# Patient Record
Sex: FEMALE | Race: WHITE | HISPANIC OR LATINO | Employment: PART TIME | URBAN - METROPOLITAN AREA
[De-identification: names, ages, dates, MRNs, and addresses within clinical notes are randomized per-mention and may not be internally consistent; named-entity substitution may affect disease eponyms.]

---

## 2018-07-10 ENCOUNTER — ANESTHESIA - HEALTHEAST (OUTPATIENT)
Dept: SURGERY | Facility: HOSPITAL | Age: 38
End: 2018-07-10

## 2018-07-10 ASSESSMENT — MIFFLIN-ST. JEOR: SCORE: 1534.52

## 2018-07-11 ENCOUNTER — SURGERY - HEALTHEAST (OUTPATIENT)
Dept: SURGERY | Facility: HOSPITAL | Age: 38
End: 2018-07-11

## 2021-05-04 ENCOUNTER — RECORDS - HEALTHEAST (OUTPATIENT)
Dept: LAB | Facility: CLINIC | Age: 41
End: 2021-05-04

## 2021-05-07 LAB
BACTERIA SPEC CULT: ABNORMAL
BACTERIA SPEC CULT: ABNORMAL

## 2021-06-01 VITALS — HEIGHT: 62 IN | WEIGHT: 202 LBS | BODY MASS INDEX: 37.17 KG/M2

## 2021-06-19 NOTE — ANESTHESIA POSTPROCEDURE EVALUATION
Patient: Perlita Day  LAPAROSCOPY, RIGHT SALPINGO OOPHORECTOMY, LYSIS OF ADHESIONS  Anesthesia type: general    Patient location: PACU  Last vitals:   Vitals:    07/11/18 1050   BP: 165/87   Pulse: 96   Resp: 16   Temp:    SpO2: 95%     Post vital signs: stable  Level of consciousness: awake and answers questions via interpeter, states she is doing well.  Post-anesthesia pain: pain controlled  Post-anesthesia nausea and vomiting: no  Pulmonary: requiring supplemental oxygen at this time  Cardiovascular: stable and blood pressure at baseline  Hydration: adequate  Anesthetic events: no    QCDR Measures:  ASA# 11 - Claire-op Cardiac Arrest: ASA11B - Patient did NOT experience unanticipated cardiac arrest  ASA# 12 - Claire-op Mortality Rate: ASA12B - Patient did NOT die  ASA# 13 - PACU Re-Intubation Rate: ASA13B - Patient did NOT require a new airway mgmt  ASA# 10 - Composite Anes Safety: ASA10A - No serious adverse event    Additional Notes:

## 2021-06-19 NOTE — ANESTHESIA PREPROCEDURE EVALUATION
Anesthesia Evaluation      Patient summary reviewed   No history of anesthetic complications     Airway   Mallampati: II  Neck ROM: full   Pulmonary - negative ROS and normal exam                          Cardiovascular - normal exam  Exercise tolerance: > or = 4 METS  (+) , hypercholesterolemia,      Neuro/Psych - negative ROS     Endo/Other    (+) diabetes mellitus type 2 poorly controlled using insulin, obesity,      GI/Hepatic/Renal - negative ROS      Other findings: BMI 36+. Diabetic retinopathy.  Denies hyperthyroidism (listed in Epic).        Dental - normal exam                        Anesthesia Plan  Planned anesthetic: general endotracheal    ASA 3   Induction: intravenous   Anesthetic plan and risks discussed with: patient    Post-op plan: routine recovery

## 2021-06-19 NOTE — ANESTHESIA CARE TRANSFER NOTE
Last vitals:   Vitals:    07/11/18 1019   BP: (!) 185/91   Pulse: 96   Resp: 16   Temp: 36.8  C (98.2  F)   SpO2: 98%     Patient's level of consciousness is drowsy  Spontaneous respirations: yes  Maintains airway independently: yes  Dentition unchanged: yes  Oropharynx: oropharynx clear of all foreign objects    QCDR Measures:  ASA# 20 - Surgical Safety Checklist: WHO surgical safety checklist completed prior to induction  PQRS# 430 - Adult PONV Prevention: 4558F - Pt received => 2 anti-emetic agents (different classes) preop & intraop  ASA# 8 - Peds PONV Prevention: NA - Not pediatric patient, not GA or 2 or more risk factors NOT present  PQRS# 424 - Claire-op Temp Management: 4559F - At least one body temp DOCUMENTED => 35.5C or 95.9F within required timeframe  PQRS# 426 - PACU Transfer Protocol: - Transfer of care checklist used  ASA# 14 - Acute Post-op Pain: ASA14B - Patient did NOT experience pain >= 7 out of 10

## 2021-11-03 ENCOUNTER — HOSPITAL ENCOUNTER (EMERGENCY)
Facility: HOSPITAL | Age: 41
Discharge: HOME | End: 2021-11-03
Admitting: PHYSICIAN ASSISTANT

## 2021-11-03 VITALS
BODY MASS INDEX: 33.43 KG/M2 | DIASTOLIC BLOOD PRESSURE: 85 MMHG | TEMPERATURE: 97.3 F | RESPIRATION RATE: 18 BRPM | SYSTOLIC BLOOD PRESSURE: 189 MMHG | HEIGHT: 66 IN | OXYGEN SATURATION: 99 % | WEIGHT: 208 LBS | HEART RATE: 93 BPM

## 2021-11-03 DIAGNOSIS — S05.01XA ABRASION OF RIGHT CORNEA, INITIAL ENCOUNTER: ICD-10-CM

## 2021-11-03 PROBLEM — K43.0 INCARCERATED INCISIONAL HERNIA: Status: ACTIVE | Noted: 2021-08-22

## 2021-11-03 PROCEDURE — 99283 EMERGENCY DEPT VISIT LOW MDM: CPT

## 2021-11-03 RX ORDER — ERYTHROMYCIN 5 MG/G
0.5 OINTMENT OPHTHALMIC 4 TIMES DAILY
Qty: 10 G | Refills: 0 | Status: SHIPPED | OUTPATIENT
Start: 2021-11-03 | End: 2021-11-08

## 2021-11-03 RX ORDER — PROPARACAINE HYDROCHLORIDE 5 MG/ML
1 SOLUTION/ DROPS OPHTHALMIC ONCE
Status: DISCONTINUED | OUTPATIENT
Start: 2021-11-03 | End: 2021-11-03

## 2021-11-03 RX ORDER — TETRACAINE HYDROCHLORIDE 5 MG/ML
1-2 SOLUTION OPHTHALMIC ONCE
Status: DISCONTINUED | OUTPATIENT
Start: 2021-11-03 | End: 2021-11-03 | Stop reason: HOSPADM

## 2021-11-03 ASSESSMENT — ENCOUNTER SYMPTOMS
FACIAL SWELLING: 0
PALPITATIONS: 0
VOMITING: 0
LIGHT-HEADEDNESS: 0
TREMORS: 0
CONFUSION: 0
FEVER: 0
NAUSEA: 0
ADENOPATHY: 0
SHORTNESS OF BREATH: 0
COUGH: 0
WOUND: 0
EYE ITCHING: 0
EYE REDNESS: 1
HEADACHES: 0
ABDOMINAL PAIN: 0
ARTHRALGIAS: 0
MYALGIAS: 0
DIZZINESS: 0
PHOTOPHOBIA: 0
CHEST TIGHTNESS: 0
AGITATION: 0
EYE PAIN: 1
EYE DISCHARGE: 0
FATIGUE: 0
ACTIVITY CHANGE: 0
WEAKNESS: 0
SPEECH DIFFICULTY: 0
COLOR CHANGE: 0
NERVOUS/ANXIOUS: 0
WHEEZING: 0
VOICE CHANGE: 0
SORE THROAT: 0

## 2021-11-03 ASSESSMENT — MIFFLIN-ST. JEOR: SCORE: 1620.23

## 2021-11-03 ASSESSMENT — VISUAL ACUITY
OD: 20/200;WITH CORRECTIVE LENSES
OS: 20/25;WITH CORRECTIVE LENSES

## 2021-11-04 NOTE — DISCHARGE INSTRUCTIONS
You were seen in the emergency department for concern of right eye pain.  It does look like you have a scratch on the surface of your eye, this is called a corneal abrasion.  This should heal over the next several days.      We will help prevent infection by using an antibiotic ointment.  You should apply this to the eye 4 times a day for the next 5 days    If your symptoms are not improving in 3 days, you should be seen by the eye doctor.  I will give you information for [facility], you can call them and let them know you are seen in the emergency department and they should be able to get you in sooner.    If you develop any increased eye pain, fever, swelling or anything else concerning to you please return to the emergency department.

## 2021-11-04 NOTE — ED PROVIDER NOTES
EMERGENCY DEPARTMENT ENCOUNTER      NAME: Perlita Day  AGE: 41 year old female  YOB: 1980  MRN: [id number]  EVALUATION DATE & TIME: 11/3/2021  6:53 PM    PCP: No primary care provider on file.    ED PROVIDER: Amarilys Resendez PA-C      Chief Complaint   Patient presents with     Eye Problem         FINAL IMPRESSION:  1. Abrasion of right cornea, initial encounter        MEDICAL DECISION MAKING:    Pertinent Labs & Imaging studies reviewed. (See chart for details)  41 year old female with a h/o hypertension, diabetes presents to the Emergency Department for evaluation of right eye pain x2 days.  Patient wears glasses, denies any trauma or external irritation to the eye, however she is blurred vision and pain with upward and downward gaze for 2 days.  No drainage.  No fevers.  No localized edema.    On exam she is alert, nontoxic-appearing and sitting comfortably in the exam bed in no acute distress.  Vital signs are notable for slightly elevated blood pressure.  She has intact EOM, and does complain of discomfort with upward and downward gaze.  No ocular entrapment or palsy.  No periorbital edema. Ocular pressures are 14, 17, 14 on the right and 13, 14, 14 on the left. Visual acuity with corrective lenses 20/200 on the right, 20/40 on the left. There is central corneal uptake of fluorescein stain of the right cornea. The right lateral conjunctiva is injected.    Differential diagnosis includes corneal abrasion, acute angle closure glaucoma, uveitis, conjunctival hemorrhage, corneal foreign body, episcleritis, orbital cellulitis. Ocular pressures are normal. Guardado light exam reveals centralized uptake concerning for abrasion. No corneal ulcerations.    Will rx erythromycin ointment and give information for opthalmology follow up. There is no evidence of acute or emergent process requiring intervention at this time. Pt is appropriate for outpatient management. Provisional nature of today's  diagnosis was discussed and strict return precautions were given. Pt expressed understanding and She was discharged to home in good condition.     CRITICAL CARE: None    ED COURSE  6:45 PM  Met and evaluated patient. Discussed ED plan.   7:15 Pm Discussed pt with Dr. Marshall Mireles  8:30 PM Discharged home in good condition by RN.     MEDICATIONS GIVEN IN THE EMERGENCY:  Medications - No data to display    NEW PRESCRIPTIONS STARTED AT TODAY'S ER VISIT  Discharge Medication List as of 11/3/2021  8:31 PM      START taking these medications    Details   erythromycin (ROMYCIN) 5 MG/GM ophthalmic ointment Place 0.5 inches into the right eye 4 times daily for 5 daysDisp-10 g, [id number]                =================================================================    HPI    Patient information was obtained from: Patient and daughter in law    Use of Intrepreter: Yes Phone,  Language Angolan    Perlita Day is a 41 year old female who presents for evaluation of right eye pain x 2 days. Reports sudden onset 2 days ago without trauma. She wears corrective eye glasses but no contacts. She has tried OTC lubricating drops without significant relief. No active drainage, no pus. No pain with blinking the eye. States she does have erythema to the right lateral portion of the eye which has not progressed.     She does report a history of hypertension and DMII    REVIEW OF SYSTEMS   Review of Systems   Constitutional: Negative for activity change, fatigue and fever.   HENT: Negative for facial swelling, sore throat and voice change.    Eyes: Positive for pain, redness and visual disturbance. Negative for photophobia, discharge and itching.   Respiratory: Negative for cough, chest tightness, shortness of breath and wheezing.    Cardiovascular: Negative for chest pain and palpitations.   Gastrointestinal: Negative for abdominal pain, nausea and vomiting.   Musculoskeletal: Negative for arthralgias, gait problem and  myalgias.   Skin: Negative for color change, pallor, rash and wound.   Neurological: Negative for dizziness, tremors, speech difficulty, weakness, light-headedness and headaches.   Hematological: Negative for adenopathy.   Psychiatric/Behavioral: Negative for agitation, behavioral problems and confusion. The patient is not nervous/anxious.    All other systems reviewed and are negative.      PAST MEDICAL HISTORY:  No past medical history on file.    PAST SURGICAL HISTORY:  Past Surgical History:   Procedure Laterality Date     CHOLECYSTECTOMY  2003     UT LAP,DIAGNOSTIC ABDOMEN Right 7/11/2018    Procedure: LAPAROSCOPY, RIGHT SALPINGO OOPHORECTOMY, LYSIS OF ADHESIONS;  Surgeon: Steven Barajas MD;  Location: [facility];  Service: Gynecology       CURRENT MEDICATIONS:    erythromycin (ROMYCIN) 5 MG/GM ophthalmic ointment  folic acid (FOLVITE) 1 MG tablet  insulin detemir U-100 (LEVEMIR) 100 unit/mL injection  metFORMIN (GLUCOPHAGE) 1000 MG tablet  omega-3/dha/epa/fish oil (FISH OIL-OMEGA-3 FATTY ACIDS) 300-1,000 mg capsule  oxyCODONE-acetaminophen (PERCOCET) 5-325 mg per tablet        ALLERGIES:  No Known Allergies    FAMILY HISTORY:  Family History   Problem Relation Age of Onset     Diabetes Mother      Hypertension Mother      Diabetes Father      Hypertension Maternal Aunt      Hypertension Paternal Aunt      Diabetes Maternal Grandmother      Hypertension Maternal Grandmother        SOCIAL HISTORY:   Social History     Socioeconomic History     Marital status: Single     Spouse name: Not on file     Number of children: Not on file     Years of education: Not on file     Highest education level: Not on file   Occupational History     Not on file   Tobacco Use     Smoking status: Never Smoker     Smokeless tobacco: Never Used   Substance and Sexual Activity     Alcohol use: No     Drug use: No     Sexual activity: Not on file   Other Topics Concern     Not on file   Social History Narrative     Not on file  "    Social Determinants of Health     Financial Resource Strain:      Difficulty of Paying Living Expenses:    Food Insecurity:      Worried About Running Out of Food in the Last Year:      Ran Out of Food in the Last Year:    Transportation Needs:      Lack of Transportation (Medical):      Lack of Transportation (Non-Medical):    Physical Activity:      Days of Exercise per Week:      Minutes of Exercise per Session:    Stress:      Feeling of Stress :    Social Connections:      Frequency of Communication with Friends and Family:      Frequency of Social Gatherings with Friends and Family:      Attends [employer] Services:      Active Member of Clubs or Organizations:      Attends Club or Organization Meetings:      Marital Status:    Intimate Partner Violence:      Fear of Current or Ex-Partner:      Emotionally Abused:      Physically Abused:      Sexually Abused:          VITALS:  Patient Vitals for the past 24 hrs:   BP Temp Temp src Pulse Resp SpO2 Height Weight   11/03/21 1830 (!) 189/85 97.3  F (36.3  C) Temporal 93 18 99 % 1.676 m (5' 6\") 94.3 kg (208 lb)       PHYSICAL EXAM    Physical Exam  Vitals reviewed.   Constitutional:       General: She is not in acute distress.     Appearance: Normal appearance. She is not ill-appearing, toxic-appearing or diaphoretic.   HENT:      Head: Normocephalic and atraumatic.      Nose: No congestion.      Mouth/Throat:      Mouth: Mucous membranes are moist.      Pharynx: Oropharynx is clear.   Eyes:      General: No scleral icterus.        Right eye: No discharge.         Left eye: No discharge.      Extraocular Movements: Extraocular movements intact.      Pupils: Pupils are equal, round, and reactive to light.      Comments: No periorbital edema  No discharge  No chalazion or hordeolum     Cardiovascular:      Rate and Rhythm: Normal rate and regular rhythm.      Pulses: Normal pulses.      Heart sounds: No murmur heard.     Pulmonary:      Effort: Pulmonary effort is " normal. No respiratory distress.   Musculoskeletal:         General: No swelling or deformity. Normal range of motion.      Cervical back: Normal range of motion and neck supple.      Right lower leg: No edema.      Left lower leg: No edema.   Skin:     General: Skin is warm.      Capillary Refill: Capillary refill takes less than 2 seconds.      Coloration: Skin is not jaundiced.      Findings: No bruising, erythema, lesion or rash.   Neurological:      General: No focal deficit present.      Mental Status: She is alert and oriented to person, place, and time.      Cranial Nerves: No cranial nerve deficit.   Psychiatric:         Mood and Affect: Mood normal.         Behavior: Behavior normal.          LAB:  All pertinent labs reviewed and interpreted.    Labs Ordered and Resulted from Time of ED Arrival to Time of ED Departure - No data to display      RADIOLOGY:  Reviewed all pertinent imaging. Please see official radiology report    No orders to display         Amarilys Resendez PA-C  Emergency Medicine  [facility] EMERGENCY DEPARTMENT  [address] [place] [id number] [phone]  Dept: [phone]    This note has in part been created with speech recognition technology and may create an occasional, unintended word/grammar substitution. Errors are generally corrected in real time. Please message me via [employer]arity[facility] In Basket if you note any errors requiring clarification.       Amarilys Resendez PA-C  11/03/21 5544

## 2021-11-12 ENCOUNTER — TRANSCRIBE ORDERS (OUTPATIENT)
Dept: OTHER | Age: 41
End: 2021-11-12

## 2021-11-12 ENCOUNTER — TELEPHONE (OUTPATIENT)
Dept: OPHTHALMOLOGY | Facility: CLINIC | Age: 41
End: 2021-11-12

## 2021-11-12 ENCOUNTER — APPOINTMENT (OUTPATIENT)
Dept: INTERPRETER SERVICES | Facility: CLINIC | Age: 41
End: 2021-11-12

## 2021-11-12 DIAGNOSIS — E11.3521 RIGHT EYE AFFECTED BY PROLIFERATIVE DIABETIC RETINOPATHY WITH TRACTION RETINAL DETACHMENT INVOLVING MACULA, ASSOCIATED WITH TYPE 2 DIABETES MELLITUS (H): Primary | ICD-10-CM

## 2021-11-12 NOTE — TELEPHONE ENCOUNTER
Referral received by clinic to Dr Ruiz    Proliferative diabetic retinopathy    No insurance coverage noted in referral    Note to finacial counselor to reach out and review options for pt to apply for assistance.    Retina team aware and reviewing scheduling option and will be reaching out to schedule    Moshe Woods RN 12:37 PM 11/12/21

## 2022-01-06 ENCOUNTER — APPOINTMENT (OUTPATIENT)
Dept: INTERPRETER SERVICES | Facility: CLINIC | Age: 42
End: 2022-01-06
Payer: MEDICAID

## 2022-02-14 ENCOUNTER — OFFICE VISIT (OUTPATIENT)
Dept: OPHTHALMOLOGY | Facility: CLINIC | Age: 42
End: 2022-02-14
Attending: OPHTHALMOLOGY
Payer: MEDICAID

## 2022-02-14 DIAGNOSIS — E11.3593 PROLIFERATIVE DIABETIC RETINOPATHY OF BOTH EYES ASSOCIATED WITH TYPE 2 DIABETES MELLITUS, MACULAR EDEMA PRESENCE UNSPECIFIED (H): Primary | ICD-10-CM

## 2022-02-14 DIAGNOSIS — H33.41 RETINAL DETACHMENT, TRACTIONAL, RIGHT: ICD-10-CM

## 2022-02-14 PROCEDURE — 92134 CPTRZ OPH DX IMG PST SGM RTA: CPT | Performed by: OPHTHALMOLOGY

## 2022-02-14 PROCEDURE — 99204 OFFICE O/P NEW MOD 45 MIN: CPT | Performed by: OPHTHALMOLOGY

## 2022-02-14 PROCEDURE — T1013 SIGN LANG/ORAL INTERPRETER: HCPCS | Mod: U4

## 2022-02-14 PROCEDURE — G0463 HOSPITAL OUTPT CLINIC VISIT: HCPCS

## 2022-02-14 PROCEDURE — 92250 FUNDUS PHOTOGRAPHY W/I&R: CPT | Performed by: OPHTHALMOLOGY

## 2022-02-14 ASSESSMENT — VISUAL ACUITY
METHOD_MR: DIAGNOSTIC MR
OS_CC: 20/25
CORRECTION_TYPE: GLASSES
METHOD_MR_RETINOSCOPY: 1
METHOD: SNELLEN - LINEAR
OD_CC: CF@1'
OS_CC+: -2

## 2022-02-14 ASSESSMENT — TONOMETRY
OD_IOP_MMHG: 12
OS_IOP_MMHG: 14
IOP_METHOD: TONOPEN

## 2022-02-14 ASSESSMENT — CONF VISUAL FIELD
OD_INFERIOR_NASAL_RESTRICTION: 1
OD_SUPERIOR_NASAL_RESTRICTION: 1
OD_INFERIOR_TEMPORAL_RESTRICTION: 1
METHOD: TOYS
OD_SUPERIOR_TEMPORAL_RESTRICTION: 1

## 2022-02-14 ASSESSMENT — REFRACTION_MANIFEST
OD_SPHERE: -2.00
OS_AXIS: 165
OS_ADD: +1.00
OD_CYLINDER: SPHERE
OS_CYLINDER: +0.50
OS_SPHERE: -2.25
OD_ADD: +1.00

## 2022-02-14 ASSESSMENT — REFRACTION_WEARINGRX
OS_AXIS: 090
OS_SPHERE: -2.50
OD_CYLINDER: SPHERE
OS_CYLINDER: +0.25
OD_SPHERE: -2.00

## 2022-02-14 ASSESSMENT — EXTERNAL EXAM - RIGHT EYE: OD_EXAM: NORMAL

## 2022-02-14 ASSESSMENT — SLIT LAMP EXAM - LIDS
COMMENTS: NORMAL
COMMENTS: NORMAL

## 2022-02-14 ASSESSMENT — EXTERNAL EXAM - LEFT EYE: OS_EXAM: NORMAL

## 2022-02-14 NOTE — NURSING NOTE
Chief Complaints and History of Present Illnesses   Patient presents with     Annual Eye Exam     DM type 2     Chief Complaint(s) and History of Present Illness(es)     Annual Eye Exam     Associated symptoms: Negative for redness, headache, floaters, itching, flashes and eye pain    Pain scale: 0/10    Comments: DM type 2              Comments     C/o blurry vision right eye X4 month   Diabetic Type 2 Dx 8 years ago  LBS: 137  Last A1c: 7.0  No results found for: A1C    Shauna Silverio COT 2:37 PM February 14, 2022

## 2022-02-14 NOTE — PROGRESS NOTES
HPI:  Perlita Day is a 41 year old female presenting for complete eye exam.    Poor vision right eye for the past 4 months. Has been unchanged for the past 4 months but very poor. Left eye is OK with glasses. Will get pain around the eyes if she has them open for a long time or is somewhere very dry.  Improves with ATs. Otherwise no eye pain.  About 10 years ago had an episode of left facial droop and weakness and had decreased vision left eye but this improved over time. Got glasses about 7-8 years ago that helped with vision.  Had eye exam at [facility] in [facility] October and was told she had a lot of blood in the right eye and would need surgery. Referred for evaluation here. Had hernia surgery and then mother had COVID in December and so appointment was delayed.    Past Ocular History:  Told she has blood in the right eye  Glasses    PMH:  DM2 - diagnosed 2014; on insulin    SH:  Never smoker. Not working presently.    FH:  Father - diabetes, cataracts, macular degeneration  Brother - diabetes, cataracts, poor vision    ASSESSMENT and PLAN:  1. Proliferative diabetic retinopathy of both eyes associated with type 2 diabetes mellitus, macular edema presence unspecified (H)  2. Retinal detachment, tractional, right  - PDR each eye with mac off TRD right eye  - baseline fundus photos 2/14/22  - OCT 2/14/22  Right Eye: partially detached posterior hyaloid, tractional retinal detachment including macular detachment, few IR fluid pockets centrally  Left Eye: partially detached posterior hyaloid with vit heme, slight blunting of foveal contour, nasal ERM, IRF and exudates inf and temp    - discussed with patient need for treatment each eye including surgery right eye; discussed guarded visual prognosis in setting of severe retinal disease and prolonged duration untreated  - follow up with surgical retina  - encouraged BS/BP control    --> to see Dr. Murphy on Thursday 2/17        -----------------------------------------------------------------------------------    Attestation:  Complete documentation of historical and exam elements from today's encounter can be found in the full encounter summary report (not reduplicated in this progress note). I personally obtained the chief complaint(s) and history of present illness.  I confirmed and edited as necessary the review of systems, past medical/surgical history, family history, social history, and examination findings as documented by others; and I examined the patient myself. I personally reviewed the relevant tests, images, and reports as documented above.     I formulated and edited as necessary the assessment and plan and discussed the findings and management plan with the patient and family.      Ana Barlow MD

## 2022-02-17 ENCOUNTER — OFFICE VISIT (OUTPATIENT)
Dept: OPHTHALMOLOGY | Facility: CLINIC | Age: 42
End: 2022-02-17
Attending: OPHTHALMOLOGY
Payer: MEDICAID

## 2022-02-17 DIAGNOSIS — H33.41 RETINAL DETACHMENT, TRACTIONAL, RIGHT: ICD-10-CM

## 2022-02-17 DIAGNOSIS — E11.3593 PROLIFERATIVE DIABETIC RETINOPATHY OF BOTH EYES ASSOCIATED WITH TYPE 2 DIABETES MELLITUS, MACULAR EDEMA PRESENCE UNSPECIFIED (H): Primary | ICD-10-CM

## 2022-02-17 PROCEDURE — 99214 OFFICE O/P EST MOD 30 MIN: CPT | Mod: 25 | Performed by: OPHTHALMOLOGY

## 2022-02-17 PROCEDURE — G0463 HOSPITAL OUTPT CLINIC VISIT: HCPCS

## 2022-02-17 PROCEDURE — 67228 TREATMENT X10SV RETINOPATHY: CPT | Mod: LT | Performed by: OPHTHALMOLOGY

## 2022-02-17 PROCEDURE — T1013 SIGN LANG/ORAL INTERPRETER: HCPCS | Mod: U4

## 2022-02-17 ASSESSMENT — EXTERNAL EXAM - RIGHT EYE: OD_EXAM: NORMAL

## 2022-02-17 ASSESSMENT — TONOMETRY
OD_IOP_MMHG: 10
OS_IOP_MMHG: 13
IOP_METHOD: TONOPEN

## 2022-02-17 ASSESSMENT — CONF VISUAL FIELD
OD_INFERIOR_TEMPORAL_RESTRICTION: 1
OD_SUPERIOR_TEMPORAL_RESTRICTION: 1
OS_NORMAL: 1
OD_SUPERIOR_NASAL_RESTRICTION: 1
OD_INFERIOR_NASAL_RESTRICTION: 1

## 2022-02-17 ASSESSMENT — VISUAL ACUITY
OD_CC: CF @ 1'
METHOD: SNELLEN - LINEAR
OS_CC: 20/25
OS_CC+: -2

## 2022-02-17 ASSESSMENT — REFRACTION_WEARINGRX
OD_CYLINDER: SPHERE
OS_SPHERE: -2.50
OS_AXIS: 090
OD_SPHERE: -2.00
OS_CYLINDER: +0.25

## 2022-02-17 ASSESSMENT — EXTERNAL EXAM - LEFT EYE: OS_EXAM: NORMAL

## 2022-02-17 ASSESSMENT — SLIT LAMP EXAM - LIDS
COMMENTS: NORMAL
COMMENTS: NORMAL

## 2022-02-17 ASSESSMENT — CUP TO DISC RATIO: OS_RATIO: 0.3

## 2022-02-17 NOTE — NURSING NOTE
Chief Complaints and History of Present Illnesses   Patient presents with     Diabetic Retinopathy Evaluation     Chief Complaint(s) and History of Present Illness(es)     Diabetic Retinopathy Evaluation     Laterality: both eyes    Onset: gradual    Associated symptoms: floaters, flashes (RE) and dryness.  Negative for eye pain and itching    Response to treatment: no improvement    Pain scale: 0/10              Comments     Perlita is here referred by Dr Barlow for mac off RE and Proliferative diabetic retinopathy of both eyes associated with type 2 diabetes mellitus, macular edema presence unspecified. Perlita states no vision change since sii tutu Barlow three day ago.      Charlie Rojas COT 12:03 PM February 17, 2022

## 2022-02-17 NOTE — PROGRESS NOTES
CC: Perlita Day is a 41 year old female  Referred by Dr. Barlow for treatment of Tractional retinal detachment right eye; proliferative diabetic retinopathy both eyes     First sara with me     HPI: patient reports Poor vision right eye for the past 4 months. Has been unchanged for the past 4 months but very poor. Left eye is OK with glasses. Will get pain around the eyes if she has them open for a long time or is somewhere very dry.  Improves with ATs. Otherwise no eye pain.    About 10 years ago had an episode of left facial droop and weakness and had decreased vision left eye but this improved over time. Got glasses about 7-8 years ago that helped with vision.    Had eye exam at [facility] in [facility] October and was told she had a lot of blood in the right eye and would need surgery. Referred for evaluation here. Had hernia surgery and then mother had COVID in December and so appointment was delayed.    She feels her vision has been poor since 10/2021    Past Ocular History: Told she has blood in the right eye    PMH: DM2 - diagnosed 2014; on insulin (states last a1c in 10/2021 was 7)    SH: Never smoker. Not working presently.    FH: Father - diabetes, cataracts, macular degeneration  Brother - diabetes, cataracts, poor vision    Imaging:  OCT Macula 02/17/22  OD: detached temporally w/ nasal macula attached, fovea off, PHV attached  OS: attached, nl foveal contour, central mild IRF, PHF  inferiorly and attached superiorly    ASSESSMENT and PLAN:    #  Proliferative diabetic retinopathy of both eyes associated with type 2 diabetes mellitus,   With Diabetic macular edema left eye      # Retinal detachment, tractional, right  - PDR each eye with mac off TRD right eye  - baseline fundus photos 2/14/22  - OCT 2/14/22  Right Eye: partially detached posterior hyaloid, tractional retinal detachment including macular detachment, few IR fluid pockets centrally  Left Eye: partially detached  posterior hyaloid with vit heme, slight blunting of foveal contour, nasal ERM, IRF and exudates inf and temp    - discussed with patient need for treatment each eye including surgery right eye; discussed guarded visual prognosis in setting of severe retinal disease and prolonged duration untreated  - follow up with surgical retina  - encouraged BS/BP control    PLAN  - recommend laser left eye today and right eye if there is any retina attached for Panretinal laser photocoagulation (PRP)    - plan for   25 g Pars plana vitrectomy (PPV)/ membrane peel/ endolaser / gas vs oil right eye   2 hours surgery   General anesthesia    I discussed the risks, benefits, and alternatives of retinal detachment surgery with the patient.  I explained that with surgery there was approximately a 70 to 80 percent chance of success and that the surgery might fail due to formation of fibrosis, recurrent vitreous hemorrhage  or other postoperative problems.  I explained that if the surgery failed, additional surgeries might be needed.  I explained that if there were subsequent re-detachments, even more vision might be lost.  I explained that with a gas bubble in the eye, a particular head position after surgery including face-down might be necessary for a few weeks after surgery.  I also explained that airplane travel or high altitudes would have to be avoided for up to three months after surgery.  I explained that an oil bubble could be placed instead, and that it would not require such strict positioning or travel restrictions. However, an oil bubble would require further surgeries to be removed.  I explained there was a chance I might have to remove their lens during my surgery. Also there Is an increased risk for worsening cataract and need for further surgery. After explaining all risks, benefits and alternatives of the surgery including but not limited to endophthalmitis, retina detachment and cataract the patient elected to  proceed.        # Cataract both eyes   Observe for now     Will need insurance for avastin and fluorescein angiography approval   Plan for Panretinal laser photocoagulation (PRP) today 02/17/22     Josh Chin MD  Retina Fellow, PGY5    ~~~~~~~~~~~~~~~~~~~~~~~~~~~~~~~~~~   Complete documentation of historical and exam elements from today's encounter can be found in the full encounter summary report (not reduplicated in this progress note).  I personally obtained the chief complaint(s) and history of present illness.  I confirmed and edited as necessary the review of systems, past medical/surgical history, family history, social history, and examination findings as documented by others; and I examined the patient myself.  I personally reviewed the relevant tests, images, and reports as documented above.  I formulated and edited as necessary the assessment and plan and discussed the findings and management plan with the patient and family and I was present for critical portions of the procedure carried out by the resident/fellow and immediately available for the entire procedure    Emmy Murphy MD   of Ophthalmology.  Retina Service   Department of Ophthalmology and Visual Neurosciences   [facility]  Phone: [phone]   Fax: [phone]

## 2022-02-17 NOTE — LETTER
2/17/2022       RE: Perlita Day  [address]  [place] [address]     Dear Colleague,    Thank you for referring your patient, Perlita Day, to the [facility] at [facility]. Please see a copy of my visit note below.    CC: Perlita Day is a 41 year old female  Referred by Dr. Barlow for treatment of Tractional retinal detachment right eye; proliferative diabetic retinopathy both eyes     First sara with me     HPI: patient reports Poor vision right eye for the past 4 months. Has been unchanged for the past 4 months but very poor. Left eye is OK with glasses. Will get pain around the eyes if she has them open for a long time or is somewhere very dry.  Improves with ATs. Otherwise no eye pain.    About 10 years ago had an episode of left facial droop and weakness and had decreased vision left eye but this improved over time. Got glasses about 7-8 years ago that helped with vision.    Had eye exam at [facility] in [facility] October and was told she had a lot of blood in the right eye and would need surgery. Referred for evaluation here. Had hernia surgery and then mother had COVID in December and so appointment was delayed.    She feels her vision has been poor since 10/2021    Past Ocular History: Told she has blood in the right eye    PMH: DM2 - diagnosed 2014; on insulin (states last a1c in 10/2021 was 7)    SH: Never smoker. Not working presently.    FH: Father - diabetes, cataracts, macular degeneration  Brother - diabetes, cataracts, poor vision    Imaging:  OCT Macula 02/17/22  OD: detached temporally w/ nasal macula attached, fovea off, PHV attached  OS: attached, nl foveal contour, central mild IRF, PHF  inferiorly and attached superiorly    ASSESSMENT and PLAN:    #  Proliferative diabetic retinopathy of both eyes associated with type 2 diabetes mellitus,   With Diabetic macular  edema left eye      # Retinal detachment, tractional, right  - PDR each eye with mac off TRD right eye  - baseline fundus photos 2/14/22  - OCT 2/14/22  Right Eye: partially detached posterior hyaloid, tractional retinal detachment including macular detachment, few IR fluid pockets centrally  Left Eye: partially detached posterior hyaloid with vit heme, slight blunting of foveal contour, nasal ERM, IRF and exudates inf and temp    - discussed with patient need for treatment each eye including surgery right eye; discussed guarded visual prognosis in setting of severe retinal disease and prolonged duration untreated  - follow up with surgical retina  - encouraged BS/BP control    PLAN  - recommend laser left eye today and right eye if there is any retina attached for Panretinal laser photocoagulation (PRP)    - plan for   25 g Pars plana vitrectomy (PPV)/ membrane peel/ endolaser / gas vs oil right eye   2 hours surgery   General anesthesia    I discussed the risks, benefits, and alternatives of retinal detachment surgery with the patient.  I explained that with surgery there was approximately a 70 to 80 percent chance of success and that the surgery might fail due to formation of fibrosis, recurrent vitreous hemorrhage  or other postoperative problems.  I explained that if the surgery failed, additional surgeries might be needed.  I explained that if there were subsequent re-detachments, even more vision might be lost.  I explained that with a gas bubble in the eye, a particular head position after surgery including face-down might be necessary for a few weeks after surgery.  I also explained that airplane travel or high altitudes would have to be avoided for up to three months after surgery.  I explained that an oil bubble could be placed instead, and that it would not require such strict positioning or travel restrictions. However, an oil bubble would require further surgeries to be removed.  I explained there was  a chance I might have to remove their lens during my surgery. Also there Is an increased risk for worsening cataract and need for further surgery. After explaining all risks, benefits and alternatives of the surgery including but not limited to endophthalmitis, retina detachment and cataract the patient elected to proceed.        # Cataract both eyes   Observe for now     Will need insurance for avastin and fluorescein angiography approval   Plan for Panretinal laser photocoagulation (PRP) today 02/17/22     Josh Chin MD  Retina Fellow, PGY5    ~~~~~~~~~~~~~~~~~~~~~~~~~~~~~~~~~~   Complete documentation of historical and exam elements from today's encounter can be found in the full encounter summary report (not reduplicated in this progress note).  I personally obtained the chief complaint(s) and history of present illness.  I confirmed and edited as necessary the review of systems, past medical/surgical history, family history, social history, and examination findings as documented by others; and I examined the patient myself.  I personally reviewed the relevant tests, images, and reports as documented above.  I formulated and edited as necessary the assessment and plan and discussed the findings and management plan with the patient and family and I was present for critical portions of the procedure carried out by the resident/fellow and immediately available for the entire procedure    Emmy Murphy MD   of Ophthalmology.  Retina Service   Department of Ophthalmology and Visual Neurosciences   [facility]  Phone: [phone]   Fax: [phone]             Again, thank you for allowing me to participate in the care of your patient.      Sincerely,    Emmy Murphy M.D.   of Ophthalmology  Vitreoretinal Surgeon  [facility]  Department of Ophthalmology & Visual Neurosciences  [facility]  Phone:  [phone]   Fax:   [phone]

## 2022-02-18 ENCOUNTER — APPOINTMENT (OUTPATIENT)
Dept: INTERPRETER SERVICES | Facility: CLINIC | Age: 42
End: 2022-02-18
Payer: MEDICAID

## 2022-02-24 ENCOUNTER — OFFICE VISIT (OUTPATIENT)
Dept: OPHTHALMOLOGY | Facility: CLINIC | Age: 42
End: 2022-02-24
Attending: OPHTHALMOLOGY
Payer: MEDICAID

## 2022-02-24 DIAGNOSIS — E11.3521 RIGHT EYE AFFECTED BY PROLIFERATIVE DIABETIC RETINOPATHY WITH TRACTION RETINAL DETACHMENT INVOLVING MACULA, ASSOCIATED WITH TYPE 2 DIABETES MELLITUS (H): Primary | ICD-10-CM

## 2022-02-24 DIAGNOSIS — E11.3512 PROLIFERATIVE DIABETIC RETINOPATHY OF LEFT EYE WITH MACULAR EDEMA ASSOCIATED WITH TYPE 2 DIABETES MELLITUS (H): ICD-10-CM

## 2022-02-24 PROCEDURE — 99213 OFFICE O/P EST LOW 20 MIN: CPT | Mod: GC | Performed by: OPHTHALMOLOGY

## 2022-02-24 PROCEDURE — G0463 HOSPITAL OUTPT CLINIC VISIT: HCPCS

## 2022-02-24 ASSESSMENT — VISUAL ACUITY
OS_CC: 20/40
OS_PH_CC: 20/30
OS_CC+: +2
OD_PH_CC: 20/200
OD_CC: 20/300
OS_PH_CC+: -1
METHOD: SNELLEN - LINEAR

## 2022-02-24 ASSESSMENT — SLIT LAMP EXAM - LIDS
COMMENTS: NORMAL
COMMENTS: NORMAL

## 2022-02-24 ASSESSMENT — EXTERNAL EXAM - RIGHT EYE: OD_EXAM: NORMAL

## 2022-02-24 ASSESSMENT — TONOMETRY
IOP_METHOD: TONOPEN
OS_IOP_MMHG: 12
OD_IOP_MMHG: 10

## 2022-02-24 ASSESSMENT — CONF VISUAL FIELD
OD_SUPERIOR_NASAL_RESTRICTION: 3
OD_SUPERIOR_TEMPORAL_RESTRICTION: 3
OD_INFERIOR_NASAL_RESTRICTION: 3
OD_INFERIOR_TEMPORAL_RESTRICTION: 3

## 2022-02-24 ASSESSMENT — CUP TO DISC RATIO: OS_RATIO: 0.3

## 2022-02-24 ASSESSMENT — EXTERNAL EXAM - LEFT EYE: OS_EXAM: NORMAL

## 2022-02-24 NOTE — PROGRESS NOTES
CC: Perlita Day is a 41 year old female  Referred by Dr. Barlow for treatment of Tractional retinal detachment right eye; proliferative diabetic retinopathy both eyes     First sara with me     HPI: patient reports Poor vision right eye for the past 4 months. Has been unchanged for the past 4 months but very poor. Left eye is OK with glasses. Will get pain around the eyes if she has them open for a long time or is somewhere very dry.  Improves with ATs. Otherwise no eye pain.    About 10 years ago had an episode of left facial droop and weakness and had decreased vision left eye but this improved over time. Got glasses about 7-8 years ago that helped with vision.    [facility] October and was told she had a lot of blood in the right eye and would need surgery. Referred for evaluation here. Had hernia surgery and then mother had COVID in December and so appointment was delayed.    She feels her vision has been poor since 10/2021    Past Ocular History: Told she has blood in the right eye    PMH: DM2 - diagnosed 2014; on insulin (states last a1c in 10/2021 was 7)    SH: Never smoker. Not working presently.    FH: Father - diabetes, cataracts, macular degeneration  Brother - diabetes, cataracts, poor vision    Imaging:  - OCT 2/14/22  Right Eye: partially detached posterior hyaloid, tractional retinal detachment including macular detachment, few IR fluid pockets centrally  Left Eye: partially detached posterior hyaloid with vit heme, slight blunting of foveal contour, nasal ERM, IRF and exudates inf and temp    ASSESSMENT and PLAN:    #  Proliferative diabetic retinopathy of left eye + DME  - s/p PRP 2/17/22  - NVD regressed today, PRH stable, IOP wnl, no NVI      # Retinal detachment, tractional, right  - PDR each eye with mac-off TRD right eye  - baseline fundus photos 2/14/22      - Called financial counselor today to connect patient, they made an appt to fill out  application for emergency coverage on 2/28 at 2PM   - if emergency coverage is declined (pt w/o SSN) she may still qualify for [facility] care       PLAN  - recommend laser left eye today and right eye if there is any retina attached for Panretinal laser photocoagulation (PRP)    - plan for   25 g Pars plana vitrectomy (PPV)/ membrane peel/ endolaser / gas vs oil right eye   2 hours surgery   General anesthesia    I discussed the risks, benefits, and alternatives of retinal detachment surgery with the patient.  I explained that with surgery there was approximately a 70 to 80 percent chance of success and that the surgery might fail due to formation of fibrosis, recurrent vitreous hemorrhage  or other postoperative problems.  I explained that if the surgery failed, additional surgeries might be needed.  I explained that if there were subsequent re-detachments, even more vision might be lost.  I explained that with a gas bubble in the eye, a particular head position after surgery including face-down might be necessary for a few weeks after surgery.  I also explained that airplane travel or high altitudes would have to be avoided for up to three months after surgery.  I explained that an oil bubble could be placed instead, and that it would not require such strict positioning or travel restrictions. However, an oil bubble would require further surgeries to be removed.  I explained there was a chance I might have to remove their lens during my surgery. Also there Is an increased risk for worsening cataract and need for further surgery. After explaining all risks, benefits and alternatives of the surgery including but not limited to endophthalmitis, retina detachment and cataract the patient elected to proceed.        # Cataract both eyes   Observe for now     Will need insurance for avastin and fluorescein angiography approval       Josh Chin MD  Retina Fellow, PGY5    ~~~~~~~~~~~~~~~~~~~~~~~~~~~~~~~~~~   Complete  documentation of historical and exam elements from today's encounter can be found in the full encounter summary report (not reduplicated in this progress note).  I personally obtained the chief complaint(s) and history of present illness.  I confirmed and edited as necessary the review of systems, past medical/surgical history, family history, social history, and examination findings as documented by others; and I examined the patient myself.  I personally reviewed the relevant tests, images, and reports as documented above.  I formulated and edited as necessary the assessment and plan and discussed the findings and management plan with the patient and family and I was present for critical portions of the procedure carried out by the resident/fellow and immediately available for the entire procedure    Emmy Murphy MD   of Ophthalmology.  Retina Service   Department of Ophthalmology and Visual Neurosciences   [facility]  Phone: [phone]   Fax: [phone]

## 2022-02-24 NOTE — NURSING NOTE
Chief Complaints and History of Present Illnesses   Patient presents with     Diabetic Retinopathy Follow Up     Chief Complaint(s) and History of Present Illness(es)     Diabetic Retinopathy Follow Up     Laterality: both eyes    Onset: gradual    Severity: moderate    Frequency: constantly    Timing: throughout the day    Response to treatment: no improvement    Pain scale: 0/10              Comments     Perlita is here post Laser LE and possible injection today. History of Proliferative diabetic retinopathy of both eyes associated with type 2 diabetes mellitus, macular edema presence unspecified. She says LE feels good and denies pain    Charlie Rojas COT 1:31 PM February 24, 2022

## 2022-03-31 ENCOUNTER — APPOINTMENT (OUTPATIENT)
Dept: INTERPRETER SERVICES | Facility: CLINIC | Age: 42
End: 2022-03-31
Payer: MEDICAID

## 2022-06-07 DIAGNOSIS — E11.3521 RIGHT EYE AFFECTED BY PROLIFERATIVE DIABETIC RETINOPATHY WITH TRACTION RETINAL DETACHMENT INVOLVING MACULA, ASSOCIATED WITH TYPE 2 DIABETES MELLITUS (H): Primary | ICD-10-CM

## 2022-06-08 ENCOUNTER — OFFICE VISIT (OUTPATIENT)
Dept: OPHTHALMOLOGY | Facility: CLINIC | Age: 42
End: 2022-06-08
Attending: OPHTHALMOLOGY
Payer: MEDICAID

## 2022-06-08 DIAGNOSIS — E11.3592 PROLIFERATIVE DIABETIC RETINOPATHY OF LEFT EYE WITHOUT MACULAR EDEMA ASSOCIATED WITH TYPE 2 DIABETES MELLITUS (H): Primary | ICD-10-CM

## 2022-06-08 DIAGNOSIS — E11.3521 RIGHT EYE AFFECTED BY PROLIFERATIVE DIABETIC RETINOPATHY WITH TRACTION RETINAL DETACHMENT INVOLVING MACULA, ASSOCIATED WITH TYPE 2 DIABETES MELLITUS (H): ICD-10-CM

## 2022-06-08 PROCEDURE — 92012 INTRM OPH EXAM EST PATIENT: CPT | Mod: 25 | Performed by: OPHTHALMOLOGY

## 2022-06-08 PROCEDURE — 92134 CPTRZ OPH DX IMG PST SGM RTA: CPT | Performed by: OPHTHALMOLOGY

## 2022-06-08 PROCEDURE — 67228 TREATMENT X10SV RETINOPATHY: CPT | Mod: LT | Performed by: OPHTHALMOLOGY

## 2022-06-08 PROCEDURE — G0463 HOSPITAL OUTPT CLINIC VISIT: HCPCS | Mod: 25

## 2022-06-08 ASSESSMENT — VISUAL ACUITY
CORRECTION_TYPE: GLASSES
METHOD: SNELLEN - LINEAR
OS_PH_CC: 20/20
OS_CC: 20/40
OS_CC+: -2
OD_CC: 20/400

## 2022-06-08 ASSESSMENT — EXTERNAL EXAM - RIGHT EYE: OD_EXAM: NORMAL

## 2022-06-08 ASSESSMENT — CONF VISUAL FIELD
OD_INFERIOR_NASAL_RESTRICTION: 3
OD_SUPERIOR_NASAL_RESTRICTION: 1
OD_INFERIOR_TEMPORAL_RESTRICTION: 3
OD_SUPERIOR_TEMPORAL_RESTRICTION: 1
OS_NORMAL: 1

## 2022-06-08 ASSESSMENT — TONOMETRY
IOP_METHOD: TONOPEN
OS_IOP_MMHG: 22
OD_IOP_MMHG: 15

## 2022-06-08 ASSESSMENT — EXTERNAL EXAM - LEFT EYE: OS_EXAM: NORMAL

## 2022-06-08 ASSESSMENT — SLIT LAMP EXAM - LIDS
COMMENTS: NORMAL
COMMENTS: NORMAL

## 2022-06-08 ASSESSMENT — CUP TO DISC RATIO: OS_RATIO: 0.3

## 2022-06-08 NOTE — NURSING NOTE
Chief Complaints and History of Present Illnesses   Patient presents with     Diabetic Retinopathy Follow Up     Follow up for PDR with DME left eye, PDR right eye, and RD right eye.        Chief Complaint(s) and History of Present Illness(es)     Diabetic Retinopathy Follow Up     Laterality: both eyes    Onset: months ago    Quality: blurred vision    Course: stable    Associated symptoms: Negative for dryness, eye pain, flashes and floaters    Treatments tried: no treatments    Pain scale: 0/10    Comments: Follow up for PDR with DME left eye, PDR right eye, and RD right eye.                 Comments     DM2  Last BS: 143 yesterday   No results found for: A1C  Unknown last A1C    RORO Lange 3:12 PM 06/08/2022

## 2022-06-08 NOTE — PROGRESS NOTES
CC: Perlita Day is a 41 year old female  Referred by Dr. Barlow for treatment of Tractional retinal detachment right eye; proliferative diabetic retinopathy both eyes     Follow up sara with me     HPI: patient reports Poor vision right eye for the past 4 months. Has been unchanged for the past 4 months but very poor. Left eye is OK with glasses. Will get pain around the eyes if she has them open for a long time or is somewhere very dry.  Improves with ATs. Otherwise no eye pain.    About 10 years ago had an episode of left facial droop and weakness and had decreased vision left eye but this improved over time. Got glasses about 7-8 years ago that helped with vision.    Had eye exam at [facility] in [facility] October and was told she had a lot of blood in the right eye and would need surgery. Referred for evaluation here. Had hernia surgery and then mother had COVID in December and so appointment was delayed.    She feels her vision has been poor since 10/2021    Past Ocular History: Told she has blood in the right eye    PMH: DM2 - diagnosed 2014; on insulin (states last a1c in 10/2021 was 7)    SH: Never smoker. Not working presently.    FH: Father - diabetes, cataracts, macular degeneration  Brother - diabetes, cataracts, poor vision    Imaging:  - Optical Coherence Tomography 06/08/22   Right Eye: partially detached posterior hyaloid, tractional retinal detachment including macular detachment, few IR fluid pockets centrally  Left Eye: partially detached posterior hyaloid with vit heme, good foveal contour, nasal ERM, IRF and exudates inf and temp    ASSESSMENT and PLAN:    # history of Diabetes mellitus II  #  Proliferative diabetic retinopathy of left eye   No NVI  - s/p PRP 2/17/22  - appears with persistnet neovascularization of the disc 06/08/22   - recommend Panretinal laser photocoagulation (PRP) filling 06/08/22     # Retinal detachment, tractional, right  - PDR each eye with mac-off TRD  right eye  - baseline fundus photos 2/14/22    PLAN  - recommend laser filling left eye (PRP) left eye     - 25 g Pars plana vitrectomy (PPV)/ membrane peel/ endolaser / gas vs oil right eye   2 hours surgery   General anesthesia    I discussed the risks, benefits, and alternatives of retinal detachment surgery with the patient.  I explained that with surgery there was approximately a 70 to 80 percent chance of success and that the surgery might fail due to formation of fibrosis, recurrent vitreous hemorrhage  or other postoperative problems.  I explained that if the surgery failed, additional surgeries might be needed.  I explained that if there were subsequent re-detachments, even more vision might be lost.  I explained that with a gas bubble in the eye, a particular head position after surgery including face-down might be necessary for a few weeks after surgery.  I also explained that airplane travel or high altitudes would have to be avoided for up to three months after surgery.  I explained that an oil bubble could be placed instead, and that it would not require such strict positioning or travel restrictions. However, an oil bubble would require further surgeries to be removed.  I explained there was a chance I might have to remove their lens during my surgery. Also there Is an increased risk for worsening cataract and need for further surgery. After explaining all risks, benefits and alternatives of the surgery including but not limited to endophthalmitis, retina detachment and cataract the patient elected to proceed.        # Cataract both eyes   Observe for now     Will fluorescein angiography in the future     ~~~~~~~~~~~~~~~~~~~~~~~~~~~~~~~~~~   Complete documentation of historical and exam elements from today's encounter can be found in the full encounter summary report (not reduplicated in this progress note).  I personally obtained the chief complaint(s) and history of present illness.  I confirmed and  edited as necessary the review of systems, past medical/surgical history, family history, social history, and examination findings as documented by others; and I examined the patient myself.  I personally reviewed the relevant tests, images, and reports as documented above.  I formulated and edited as necessary the assessment and plan and discussed the findings and management plan with the patient and family    Emmy Murphy MD   of Ophthalmology.  Retina Service   Department of Ophthalmology and Visual Neurosciences   [facility]  Phone: [phone]   Fax: [phone]

## 2022-06-08 NOTE — Clinical Note
This patient will need to schedule for  Retinal detachment  repair this coming Tuesday. Pls add to my schedule. thanks

## 2022-06-09 ENCOUNTER — APPOINTMENT (OUTPATIENT)
Dept: INTERPRETER SERVICES | Facility: CLINIC | Age: 42
End: 2022-06-09
Payer: MEDICAID

## 2022-06-09 ENCOUNTER — TELEPHONE (OUTPATIENT)
Dept: OPHTHALMOLOGY | Facility: CLINIC | Age: 42
End: 2022-06-09
Payer: MEDICAID

## 2022-06-09 PROBLEM — E11.3521: Status: ACTIVE | Noted: 2022-06-09

## 2022-06-13 ENCOUNTER — ANESTHESIA EVENT (OUTPATIENT)
Dept: SURGERY | Facility: AMBULATORY SURGERY CENTER | Age: 42
End: 2022-06-13
Payer: MEDICAID

## 2022-06-13 RX ORDER — FENTANYL CITRATE 50 UG/ML
25 INJECTION, SOLUTION INTRAMUSCULAR; INTRAVENOUS
Status: CANCELLED | OUTPATIENT
Start: 2022-06-13

## 2022-06-14 ENCOUNTER — ANESTHESIA (OUTPATIENT)
Dept: SURGERY | Facility: AMBULATORY SURGERY CENTER | Age: 42
End: 2022-06-14
Payer: MEDICAID

## 2022-06-14 ENCOUNTER — HOSPITAL ENCOUNTER (OUTPATIENT)
Facility: AMBULATORY SURGERY CENTER | Age: 42
Discharge: HOME | End: 2022-06-14
Attending: OPHTHALMOLOGY
Payer: MEDICAID

## 2022-06-14 VITALS
BODY MASS INDEX: 38.98 KG/M2 | SYSTOLIC BLOOD PRESSURE: 165 MMHG | WEIGHT: 220 LBS | DIASTOLIC BLOOD PRESSURE: 90 MMHG | HEIGHT: 63 IN | HEART RATE: 89 BPM | TEMPERATURE: 97.7 F | OXYGEN SATURATION: 94 % | RESPIRATION RATE: 17 BRPM

## 2022-06-14 DIAGNOSIS — E11.3521 RIGHT EYE AFFECTED BY PROLIFERATIVE DIABETIC RETINOPATHY WITH TRACTION RETINAL DETACHMENT INVOLVING MACULA, ASSOCIATED WITH TYPE 2 DIABETES MELLITUS (H): ICD-10-CM

## 2022-06-14 LAB
GLUCOSE BLDC GLUCOMTR-MCNC: 220 MG/DL (ref 70–99)
GLUCOSE BLDC GLUCOMTR-MCNC: 228 MG/DL (ref 70–99)
HCG UR QL: NEGATIVE
INTERNAL QC OK POCT: NORMAL
POCT KIT EXPIRATION DATE: NORMAL
POCT KIT LOT NUMBER: NORMAL

## 2022-06-14 PROCEDURE — 67043 VIT FOR MEMBRANE DISSECT: CPT | Mod: 79 | Performed by: OPHTHALMOLOGY

## 2022-06-14 PROCEDURE — 81025 URINE PREGNANCY TEST: CPT | Performed by: PATHOLOGY

## 2022-06-14 PROCEDURE — 82962 GLUCOSE BLOOD TEST: CPT | Mod: 91 | Performed by: PATHOLOGY

## 2022-06-14 PROCEDURE — 67043 VIT FOR MEMBRANE DISSECT: CPT | Mod: RT

## 2022-06-14 DEVICE — IMPLANTABLE DEVICE
Type: IMPLANTABLE DEVICE | Site: EYE | Status: NON-FUNCTIONAL
Removed: 2022-12-13

## 2022-06-14 RX ORDER — SODIUM CHLORIDE, SODIUM LACTATE, POTASSIUM CHLORIDE, CALCIUM CHLORIDE 600; 310; 30; 20 MG/100ML; MG/100ML; MG/100ML; MG/100ML
INJECTION, SOLUTION INTRAVENOUS CONTINUOUS
Status: DISCONTINUED | OUTPATIENT
Start: 2022-06-14 | End: 2022-06-15 | Stop reason: HOSPADM

## 2022-06-14 RX ORDER — LIDOCAINE HYDROCHLORIDE 20 MG/ML
INJECTION, SOLUTION INFILTRATION; PERINEURAL PRN
Status: DISCONTINUED | OUTPATIENT
Start: 2022-06-14 | End: 2022-06-14

## 2022-06-14 RX ORDER — ATROPINE SULFATE 10 MG/ML
SOLUTION/ DROPS OPHTHALMIC PRN
Status: DISCONTINUED | OUTPATIENT
Start: 2022-06-14 | End: 2022-06-14 | Stop reason: HOSPADM

## 2022-06-14 RX ORDER — PROPARACAINE HYDROCHLORIDE 5 MG/ML
1 SOLUTION/ DROPS OPHTHALMIC ONCE
Status: COMPLETED | OUTPATIENT
Start: 2022-06-14 | End: 2022-06-14

## 2022-06-14 RX ORDER — PROPOFOL 10 MG/ML
INJECTION, EMULSION INTRAVENOUS CONTINUOUS PRN
Status: DISCONTINUED | OUTPATIENT
Start: 2022-06-14 | End: 2022-06-14

## 2022-06-14 RX ORDER — ONDANSETRON 4 MG/1
4 TABLET, ORALLY DISINTEGRATING ORAL EVERY 30 MIN PRN
Status: DISCONTINUED | OUTPATIENT
Start: 2022-06-14 | End: 2022-06-15 | Stop reason: HOSPADM

## 2022-06-14 RX ORDER — NEOMYCIN POLYMYXIN B SULFATES AND DEXAMETHASONE 3.5; 10000; 1 MG/ML; [USP'U]/ML; MG/ML
1 SUSPENSION/ DROPS OPHTHALMIC 4 TIMES DAILY
Qty: 5 ML | Refills: 1 | Status: SHIPPED | OUTPATIENT
Start: 2022-06-14 | End: 2022-12-13

## 2022-06-14 RX ORDER — ACETAMINOPHEN 325 MG/1
975 TABLET ORAL ONCE
Status: COMPLETED | OUTPATIENT
Start: 2022-06-14 | End: 2022-06-14

## 2022-06-14 RX ORDER — ONDANSETRON 2 MG/ML
4 INJECTION INTRAMUSCULAR; INTRAVENOUS EVERY 30 MIN PRN
Status: DISCONTINUED | OUTPATIENT
Start: 2022-06-14 | End: 2022-06-15 | Stop reason: HOSPADM

## 2022-06-14 RX ORDER — LIDOCAINE 40 MG/G
CREAM TOPICAL
Status: DISCONTINUED | OUTPATIENT
Start: 2022-06-14 | End: 2022-06-15 | Stop reason: HOSPADM

## 2022-06-14 RX ORDER — ONDANSETRON 2 MG/ML
INJECTION INTRAMUSCULAR; INTRAVENOUS PRN
Status: DISCONTINUED | OUTPATIENT
Start: 2022-06-14 | End: 2022-06-14

## 2022-06-14 RX ORDER — BALANCED SALT SOLUTION 6.4; .75; .48; .3; 3.9; 1.7 MG/ML; MG/ML; MG/ML; MG/ML; MG/ML; MG/ML
SOLUTION OPHTHALMIC PRN
Status: DISCONTINUED | OUTPATIENT
Start: 2022-06-14 | End: 2022-06-14 | Stop reason: HOSPADM

## 2022-06-14 RX ORDER — PROPOFOL 10 MG/ML
INJECTION, EMULSION INTRAVENOUS PRN
Status: DISCONTINUED | OUTPATIENT
Start: 2022-06-14 | End: 2022-06-14

## 2022-06-14 RX ORDER — FENTANYL CITRATE 50 UG/ML
25 INJECTION, SOLUTION INTRAMUSCULAR; INTRAVENOUS EVERY 5 MIN PRN
Status: DISCONTINUED | OUTPATIENT
Start: 2022-06-14 | End: 2022-06-15 | Stop reason: HOSPADM

## 2022-06-14 RX ORDER — DEXAMETHASONE SODIUM PHOSPHATE 4 MG/ML
INJECTION, SOLUTION INTRA-ARTICULAR; INTRALESIONAL; INTRAMUSCULAR; INTRAVENOUS; SOFT TISSUE PRN
Status: DISCONTINUED | OUTPATIENT
Start: 2022-06-14 | End: 2022-06-14

## 2022-06-14 RX ORDER — FENTANYL CITRATE 50 UG/ML
INJECTION, SOLUTION INTRAMUSCULAR; INTRAVENOUS PRN
Status: DISCONTINUED | OUTPATIENT
Start: 2022-06-14 | End: 2022-06-14

## 2022-06-14 RX ORDER — CYCLOPENTOLAT/TROPIC/PHENYLEPH 1%-1%-2.5%
1 DROPS (EA) OPHTHALMIC (EYE)
Status: COMPLETED | OUTPATIENT
Start: 2022-06-14 | End: 2022-06-14

## 2022-06-14 RX ADMIN — FENTANYL CITRATE 50 MCG: 50 INJECTION, SOLUTION INTRAMUSCULAR; INTRAVENOUS at 10:20

## 2022-06-14 RX ADMIN — FENTANYL CITRATE 50 MCG: 50 INJECTION, SOLUTION INTRAMUSCULAR; INTRAVENOUS at 09:06

## 2022-06-14 RX ADMIN — Medication 100 MCG: at 09:38

## 2022-06-14 RX ADMIN — LIDOCAINE HYDROCHLORIDE 100 MG: 20 INJECTION, SOLUTION INFILTRATION; PERINEURAL at 09:06

## 2022-06-14 RX ADMIN — PROPARACAINE HYDROCHLORIDE 1 DROP: 5 SOLUTION/ DROPS OPHTHALMIC at 07:27

## 2022-06-14 RX ADMIN — PROPOFOL 200 MG: 10 INJECTION, EMULSION INTRAVENOUS at 09:06

## 2022-06-14 RX ADMIN — ONDANSETRON 4 MG: 2 INJECTION INTRAMUSCULAR; INTRAVENOUS at 09:12

## 2022-06-14 RX ADMIN — Medication 100 MCG: at 11:41

## 2022-06-14 RX ADMIN — Medication 1 DROP: at 07:37

## 2022-06-14 RX ADMIN — Medication 1 DROP: at 07:33

## 2022-06-14 RX ADMIN — PROPOFOL 100 MG: 10 INJECTION, EMULSION INTRAVENOUS at 09:07

## 2022-06-14 RX ADMIN — PROPOFOL 150 MCG/KG/MIN: 10 INJECTION, EMULSION INTRAVENOUS at 09:07

## 2022-06-14 RX ADMIN — Medication 100 MCG: at 10:41

## 2022-06-14 RX ADMIN — Medication 1 DROP: at 07:28

## 2022-06-14 RX ADMIN — Medication 100 MCG: at 09:48

## 2022-06-14 RX ADMIN — SODIUM CHLORIDE, SODIUM LACTATE, POTASSIUM CHLORIDE, CALCIUM CHLORIDE: 600; 310; 30; 20 INJECTION, SOLUTION INTRAVENOUS at 07:51

## 2022-06-14 RX ADMIN — Medication 100 MCG: at 11:08

## 2022-06-14 RX ADMIN — DEXAMETHASONE SODIUM PHOSPHATE 4 MG: 4 INJECTION, SOLUTION INTRA-ARTICULAR; INTRALESIONAL; INTRAMUSCULAR; INTRAVENOUS; SOFT TISSUE at 09:07

## 2022-06-14 RX ADMIN — ACETAMINOPHEN 975 MG: 325 TABLET ORAL at 07:45

## 2022-06-14 NOTE — DISCHARGE INSTRUCTIONS
POST-OPERATIVE INSTRUCTIONS FOLLOWING SURGERY    Emmy Murphy MD  Department of Ophthalmology  [facility]  [phone]    FOLLOW UP:  You have a follow up appointment tomorrow at 12:30 PM at the Eye Clinic in the [facility] 9th floor.      EYE DROPS  Drops will be given to you after the surgery.  They DO NOT need to be used until after you are seen in clinic.  DO NOT disturb your bandage the first night.      When using more than one drop, separate them by 3 minutes between drops.  Common times to place drops are breakfast, lunch, dinner, and bedtime.  Do not stop your drops without discussing with our office.  If you run out before your appointment, call and we will send in a refill.    Maxitrol Drops  --- 4 times per day      HEAD POSITIONING  You have a bubble of oil in your eye that we placed at the end of your surgery. You can do a lot to help your eye heal if you can position your head correctly. This is extremely important.    Position: FACE DOWN    Maintain this positioning 50 minutes of every hour until we see you tomorrow. You will likely need to keep this positioning for at least the first 3 days.    When positioning, it is OK to take brief breaks to eat, stretch, clean up, etc.  Try to position for 90% of the time (5 minute break per hour on average).  Do not lay flat on your back until the bubble is gone.    Sleeping face down can be challenging.  One method is to sleep on your stomach with your head hanging over the edge of the bed with a chair there to rest your head on.  Alternatively, you can sleep with your chest laying on top of a large pile of pillows with your head hanging over.  Alternatively, you can place 2 rows of firm pillows side by side with a gap in between.  Sleep on top of the pillows with your head in the gap.  Alternatively, it is also possible to rent positioning equipment from medical supply stores. This typically costs $150-200 per week.  "Insurance usually does not cover the cost.  Often, patients prefer the pillows they set up themselves to the rented equipment.      ACTIVITY:  No heavy lifting after surgery  Keep the bandage in place until you are seen tomorrow   Do not get your bandage wet      PAIN MEDICATION   It is common to have some mild or moderate discomfort after eye surgery.  Tylenol or ibuprofen may be taken if you don't have any other general health conditions that prevent you from taking these.      WHAT TO EXPECT  It is common for the eye to to have a blood tinged discharge for a few days after surgery  It may feel irritated (as if something were in your eye), for there to be clear discharge (thicker in the mornings upon awakening), and for it to be bloodshot for 2-3 weeks following retina surgery.       WHAT TO WATCH OUT FOR  If you experience any of the following \"RSVP Symptoms\", you should call immediately:  Worsening Redness  Worsening Sensitivity to light  Worsening Vision, including new flashing lights or floaters  Worsening Pain, including nausea/vomiting      For any of the symptoms listed above, or for other concerns, call [phone] and ask to speak to the clinic nurse.  If you call after hours, follow to prompts to reach the doctor on call.                [facility]  Home Care Following Anesthesia  For 24 hours after surgery:  Get plenty of rest.  A responsible adult must stay with you for at least 24 hours after you leave the surgery center.  Do not drive or use heavy equipment.  If you have weakness or tingling, don't drive or use heavy equipment until this feeling goes away.   Do not drink alcohol.   Avoid strenuous or risky activities.  Ask for help when climbing stairs.  You may feel lightheaded.  IF so, sit for a few minutes before standing.  Have someone help you get up.   If you have nausea (feel sick to your stomach): Drink only clear liquids such as apple juice, ginger " "marjan, broth or 7-Up.  Rest may also help.  Be sure to drink enough fluids.  Move to a regular diet as you feel able.   You may have a slight fever.  Call the doctor if your fever is over 100 F (37.7 C) (taken under the tongue) or lasts longer than 24 hours.  You may have a dry mouth, a sore throat, muscle aches or trouble sleeping. These should go away after 24 hours.  Do not make important or legal decisions.   It is recommended to avoid smoking.   If you use hormonal birth control (such as the pill, patch, ring or implants):  You will need a second form of birth control for 7 days (condoms, a diaphragm or contraceptive foam).  While in the surgery center, you received a medicine called Sugammadex.  Hormonal birth control (such as the pill, patch, ring or implants) will not work as well for a week after taking this medicine.  Today you received a Marcaine or bupivacaine block to numb the nerves near your surgery site.  This is a block using local anesthetic or \"numbing\" medication injected around the nerves to anesthetize or \"numb\" the area supplied by those nerves.  This block is injected into the muscle layer near your surgical site.  The medication may numb the location where you had surgery for 6-18 hours, but may last up to 24 hours.  If your surgical site is an arm or leg you should be careful with your affected limb, since it is possible to injure your limb without being aware of it due to the numbing.  Until full feeling returns, you should guard against bumping or hitting your limb, and avoid extreme hot or cold temperatures on the skin.  As the block wears off, the feeling will return as a tingling or prickly sensation near your surgical site.  You will experience more discomfort from your incision as the feeling returns.  You may want to take a pain pill (a narcotic or Tylenol if this was prescribed by your surgeon) when you start to experience mild pain before the pain beccomes more severe.  If your pain " medications do not control your pain you should notifiy your surgeon.    Tips for taking pain medications  To get the best pain relief possible, remember these points:  Take pain medications as directed, before pain becomes severe.  Pain medication can upset your stomach: taking it with food may help.  Constipation is a common side effect of pain medication. Drink plenty of  fluids.  Eat foods high in fiber. Take a stool softener if recommended by your doctor or pharmacist.  Do not drink alcohol, drive or operate machinery while taking pain medications.  Ask about other ways to control pain, such as with heat, ice or relaxation.    Tylenol/Acetaminophen Consumption  To help encourage the safe use of acetaminophen, the makers of TYLENOL  have lowered the maximum daily dose for single-ingredient Extra Strength TYLENOL  (acetaminophen) products sold in the [place] from 8 pills per day (4,000 mg) to 6 pills per day (3,000 mg). The dosing interval has also changed from 2 pills every 4-6 hours to 2 pills every 6 hours.  If you feel your pain relief is insufficient, you may take Tylenol/Acetaminophen in addition to your narcotic pain medication.   Be careful not to exceed 3,000 mg of Tylenol/Acetaminophen in a 24 hour period from all sources.  If you are taking extra strength Tylenol/acetaminophen (500 mg), the maximum dose is 6 tablets in 24 hours.  If you are taking regular strength acetaminophen (325 mg), the maximum dose is 9 tablets in 24 hours.    Call a doctor for any of the following:  Signs of infection (fever, growing tenderness at the surgery site, a large amount of drainage or bleeding, severe pain, foul-smelling drainage, redness, swelling).  It has been over 8 to 10 hours since surgery and you are still not able to urinate (pass water).  Headache for over 24 hours.   Numbness, tingling or weakness the day after surgery (if you had spinal anesthesia).  Signs of Covid-19 infection (temperature over 100 degrees,  shortness of breath, cough, loss of taste/smell, generalized body aches, persistent headache, chills, sore throat, nausea/vomiting/diarrhea)  Your doctor is:       Dr. Emmy Murphy, Ophthalmology: [phone]               Or dial [phone] and ask for the resident on call for:  Ophthalmology  For emergency care, call the:  [facility]:  [phone] (TTY for hearing impaired: [phone])  Tylenol 975 mg given at 745 am.  Next dose available at 145 pm.

## 2022-06-14 NOTE — ANESTHESIA CARE TRANSFER NOTE
Patient: Perlita Day    Procedure: Procedure(s):  RIGHT EYE VITRECTOMY, PARS PLANA APPROACH, USING 25-GAUGE INSTRUMENTS / membrane peel / endolaser / oil       Diagnosis: Right eye affected by proliferative diabetic retinopathy with traction retinal detachment involving macula, associated with type 2 diabetes mellitus (H) [E11.3521]  Diagnosis Additional Information: No value filed.    Anesthesia Type:   General     Note:      Level of Consciousness: drowsy  Oxygen Supplementation: face mask  Level of Supplemental Oxygen (L/min / FiO2): 4  Independent Airway: airway patency satisfactory and stable  Dentition: dentition unchanged  Vital Signs Stable: post-procedure vital signs reviewed and stable  Report to RN Given: handoff report given  Patient transferred to: PACU    Handoff Report: Identifed the Patient, Identified the Reponsible Provider, Reviewed the pertinent medical history, Discussed the surgical course, Reviewed Intra-OP anesthesia mangement and issues during anesthesia, Set expectations for post-procedure period and Allowed opportunity for questions and acknowledgement of understanding      Vitals:  Vitals Value Taken Time   /76    Temp     Pulse 79    Resp 12    SpO2 96        Electronically Signed By: AVELINO Mack CRNA  June 14, 2022  12:05 PM

## 2022-06-14 NOTE — ANESTHESIA POSTPROCEDURE EVALUATION
Patient: Perlita Day    Procedure: Procedure(s):  RIGHT EYE VITRECTOMY, PARS PLANA APPROACH, USING 25-GAUGE INSTRUMENTS / membrane peel / endolaser / oil       Anesthesia Type:  General    Note:  Disposition: Outpatient   Postop Pain Control: Uneventful            Sign Out: Well controlled pain   PONV:    Neuro/Psych: Uneventful            Sign Out: Acceptable/Baseline neuro status   Airway/Respiratory: Uneventful            Sign Out: Acceptable/Baseline resp. status   CV/Hemodynamics: Uneventful            Sign Out: Acceptable CV status; No obvious hypovolemia; No obvious fluid overload   Other NRE:    DID A NON-ROUTINE EVENT OCCUR?            Last vitals:  Vitals Value Taken Time   /87 06/14/22 1229   Temp 36.5  C (97.7  F) 06/14/22 1229   Pulse 94 06/14/22 1229   Resp 17 06/14/22 1229   SpO2 96 % 06/14/22 1229       Electronically Signed By: Pavel Goldberg MD  June 14, 2022  1:18 PM

## 2022-06-14 NOTE — OP NOTE
PRE-OP Dx:    1) Tractional and rhegmatogenous retinal detachment, right eye   2) Proliferative diabetic retinopathy    Post-OP Dx: same    Attending:   Emmy Murphy MD  Fellow: Josh Chin MD  Resident:  None      Anesthesia:  General    Procedures:   1) Pars plana vitrectomy (PPV) 25g right eye   2) Membrane Peel   3) Endolaser   4) air fluid exchange    5) Silicone oil placement, 1000cs       Findings:  Combined tractional and rhegmatogenous retinal detachment    EBL:   scant  Specimens:  none  Complications: none      Procedure Description:    Perlita Day is a 42 year old patient with diagnosis of proliferative diabetic retinopathy complicated by a tractional retinal detachment. The patient is here for surgical repair.     DESCRIPTION OF THE PROCEDURE     The patient was brought into the OR where general anesthesia was administered.    The eye was then prepped and draped in the usual fashion for ophthalmic surgery, including the installation of one drop of povidone iodine.    Attention was then turned to the vitrectomy. Marks were made on the sclera inferotemporally, superotemporally, and superonasally 3.5 mm posterior to the limbus. The 25g transscleral cannulas were inserted through the sclera using the trocars. The infusion cannula was connected inferotemporally and directly visualized to verify it was in the correct location. The vitrector handpiece and endoilluiminator were placed in the eye.     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 Extensive gliosis was present surrounding the macula. A tear was identified inferonasally.     A pars plana vitrectomy (PPV) was performed segmenting the mid-peripheral vitreous from the posterior vitreous. A peripheral shave was then performed with scleral depression.     Attention was turned to the  tractional retinal component of the detachment.    Extensive membrane peel was performed with the use of MaxGrip forceps, intraocular scissors, and the vitreous cutter.     Endodiathermy was used to betsy all breaks. The soft-tipped cannula and vitrector were used to remove viscous, yellow sub-retinal fluid.      Next, endolaser was placed in the periphery 360 degrees and surrounding all retinal breaks.  Air fluid exchange was performed and Silicone Oil 1000cs was injected into the eye    The cannulas were removed. The sclerotomies and conjunctiva  were closed with 6-0 plain suture. The pressure was checked and verified to be appropriate.    A peribulbar block consistent of a 1:1 mixtures of 2% lidocaine and 0.75 % of marcaine with epinephrine and hyaluronic acid was administered.     Subconjunctival injections of Ancef and dexamethasone were administered.     The lid speculum was removed. The eye was cleaned with wet and dry gauze. A drop of atropine and maxitrol ointment was placed in the eye. An eye pad and hicks shield were taped over the eye.     The patient tolerated well the procedure and was discharge to the post-operative unit in stable conditions with no complications.    Josh Chin MD  Retina Fellow  Department of Ophthalmology  [facility]  Pager: [phone]    The surgery was assisted by Dr.Michael Chin. Due to the delicate and complex nature of this surgery, an assistant was required. He assisted with vitrectomy. I was present for the entire surgery.

## 2022-06-14 NOTE — ANESTHESIA PREPROCEDURE EVALUATION
Anesthesia Pre-Procedure Evaluation    Patient: Perlita Day   MRN: [id number] : 1980        Procedure : Procedure(s):  RIGHT EYE VITRECTOMY, PARS PLANA APPROACH, USING 25-GAUGE INSTRUMENTS / membrane peel / endolaser / gas versus oil          Past Medical History:   Diagnosis Date     Diabetic retinopathy associated with diabetes mellitus due to underlying condition (H)       Past Surgical History:   Procedure Laterality Date     CHOLECYSTECTOMY       WY LAP,DIAGNOSTIC ABDOMEN Right 2018    Procedure: LAPAROSCOPY, RIGHT SALPINGO OOPHORECTOMY, LYSIS OF ADHESIONS;  Surgeon: Steven Barajas MD;  Location: [facility];  Service: Gynecology      No Known Allergies   Social History     Tobacco Use     Smoking status: Never Smoker     Smokeless tobacco: Never Used   Substance Use Topics     Alcohol use: No      Wt Readings from Last 1 Encounters:   21 94.3 kg (208 lb)           Physical Exam    Airway        Mallampati: II   TM distance: > 3 FB   Neck ROM: full   Mouth opening: > 3 cm    Respiratory Devices and Support         Dental  no notable dental history         Cardiovascular   cardiovascular exam normal          Pulmonary   pulmonary exam normal                OUTSIDE LABS:  CBC:   Lab Results   Component Value Date    WBC 8.6 2018    HGB 12.4 2018    HGB 11.6 (L) 2018    HCT 35.1 2018     2018     BMP:   Lab Results   Component Value Date     2018    POTASSIUM 4.1 2018    CHLORIDE 102 2018    CO2 24 2018    BUN 14 2018    CR 0.73 2018     2018     2018     COAGS: No results found for: PTT, INR, FIBR  POC:   Lab Results   Component Value Date    HCG Negative 2018     HEPATIC: No results found for: ALBUMIN, PROTTOTAL, ALT, AST, GGT, ALKPHOS, BILITOTAL, BILIDIRECT, NICHOLAS  OTHER:   Lab Results   Component Value Date    BRITTNEE 9.2 2018    CRP 0.5 2018        Anesthesia Plan    ASA Status:  2   NPO Status:  NPO Appropriate    Anesthesia Type: General.     - Airway: LMA   Induction: Intravenous, Propofol.   Maintenance: Balanced.        Consents    Anesthesia Plan(s) and associated risks, benefits, and realistic alternatives discussed. Questions answered and patient/representative(s) expressed understanding.    - Discussed:     - Discussed with:  Patient,       - Extended Intubation/Ventilatory Support Discussed: No.      - Patient is DNR/DNI Status: No    Use of blood products discussed: No .     Postoperative Care    Pain management: IV analgesics, Oral pain medications, Multi-modal analgesia.   PONV prophylaxis: Dexamethasone or Solumedrol, Ondansetron (or other 5HT-3), Background Propofol Infusion     Comments:                Pavel Goldberg MD

## 2022-06-15 ENCOUNTER — OFFICE VISIT (OUTPATIENT)
Dept: OPHTHALMOLOGY | Facility: CLINIC | Age: 42
End: 2022-06-15
Attending: OPHTHALMOLOGY
Payer: MEDICAID

## 2022-06-15 DIAGNOSIS — Z98.890 POST-OPERATIVE STATE: Primary | ICD-10-CM

## 2022-06-15 DIAGNOSIS — H40.051 BORDERLINE GLAUCOMA OF RIGHT EYE WITH OCULAR HYPERTENSION: ICD-10-CM

## 2022-06-15 PROCEDURE — G0463 HOSPITAL OUTPT CLINIC VISIT: HCPCS

## 2022-06-15 PROCEDURE — 99024 POSTOP FOLLOW-UP VISIT: CPT | Performed by: OPHTHALMOLOGY

## 2022-06-15 RX ORDER — DORZOLAMIDE HYDROCHLORIDE AND TIMOLOL MALEATE 20; 5 MG/ML; MG/ML
1 SOLUTION/ DROPS OPHTHALMIC 2 TIMES DAILY
Qty: 10 ML | Refills: 3 | Status: SHIPPED | OUTPATIENT
Start: 2022-06-15 | End: 2024-04-25

## 2022-06-15 ASSESSMENT — SLIT LAMP EXAM - LIDS
COMMENTS: NORMAL
COMMENTS: NORMAL

## 2022-06-15 ASSESSMENT — VISUAL ACUITY
CORRECTION_TYPE: GLASSES
OS_CC: 20/20
METHOD: SNELLEN - LINEAR

## 2022-06-15 ASSESSMENT — REFRACTION_WEARINGRX
OS_CYLINDER: +0.25
OS_AXIS: 090
OD_SPHERE: -2.00
OS_SPHERE: -2.50
OD_CYLINDER: SPHERE

## 2022-06-15 ASSESSMENT — TONOMETRY
IOP_METHOD: APPLANATION
OD_IOP_MMHG: 28
OS_IOP_MMHG: 12
IOP_METHOD: TONOPEN
IOP_METHOD: TONOPEN
OD_IOP_MMHG: 27
OD_IOP_MMHG: 30

## 2022-06-15 ASSESSMENT — EXTERNAL EXAM - LEFT EYE: OS_EXAM: NORMAL

## 2022-06-15 ASSESSMENT — EXTERNAL EXAM - RIGHT EYE: OD_EXAM: NORMAL

## 2022-06-15 ASSESSMENT — CUP TO DISC RATIO: OD_RATIO: 0.35

## 2022-06-15 NOTE — PROGRESS NOTES
Postoperative day 1 status post 25 g Pars plana vitrectomy (PPV)/ endolaser/ membrane peel/ air fluid exchange/ Silicone Oil 1000 cs for 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     Slept well  Retina attached  Doing well  intraocular pressure 27    Plan:  Position: face down x 1 day  No heavy lifting   Jones shield at all times  Retina detachment and endophthalmitis precautions were discussed with the patient and was asked to return if any of the those occur    Medications to operative eye  Maxitrol (tapa harrison) four times a day    Maxitrol oint at bedtime  Atropine (tapa brina) once a day   cosopt (tapa angelique) twice a day     Follow up in one week  ~~~~~~~~~~~~~~~~~~~~~~~~~~~~~~~~~~   Complete documentation of historical and exam elements from today's encounter can be found in the full encounter summary report (not reduplicated in this progress note).  I personally obtained the chief complaint(s) and history of present illness.  I confirmed and edited as necessary the review of systems, past medical/surgical history, family history, social history, and examination findings as documented by others; and I examined the patient myself.  I personally reviewed the relevant tests, images, and reports as documented above.  I formulated and edited as necessary the assessment and plan and discussed the findings and management plan with the patient and family    Emmy Murphy MD  .  Retina Service   Department of Ophthalmology and Visual Neurosciences   [facility]  Phone: [phone]   Fax: [phone]

## 2022-06-15 NOTE — PATIENT INSTRUCTIONS
Position: face down x 1 day  No heavy lifting   Jones shield at all times  Retina detachment and endophthalmitis precautions were discussed with the patient and was asked to return if any of the those occur    Medications to operative eye  Maxitrol (tapa harrison) four times a day    Maxitrol oint at bedtime  Atropine (tapa brina) once a day   cosopt (tapa angelique) twice a day

## 2022-06-22 ENCOUNTER — OFFICE VISIT (OUTPATIENT)
Dept: OPHTHALMOLOGY | Facility: CLINIC | Age: 42
End: 2022-06-22
Attending: OPHTHALMOLOGY
Payer: MEDICAID

## 2022-06-22 DIAGNOSIS — Z48.810 AFTERCARE FOLLOWING SURGERY OF A SENSORY ORGAN: Primary | ICD-10-CM

## 2022-06-22 PROCEDURE — G0463 HOSPITAL OUTPT CLINIC VISIT: HCPCS

## 2022-06-22 PROCEDURE — 99024 POSTOP FOLLOW-UP VISIT: CPT | Performed by: OPHTHALMOLOGY

## 2022-06-22 ASSESSMENT — CONF VISUAL FIELD
OD_SUPERIOR_TEMPORAL_RESTRICTION: 3
OD_INFERIOR_NASAL_RESTRICTION: 1
OD_INFERIOR_TEMPORAL_RESTRICTION: 3
OD_SUPERIOR_NASAL_RESTRICTION: 1
OS_NORMAL: 1

## 2022-06-22 ASSESSMENT — VISUAL ACUITY
OS_CC: 20/30
OS_CC+: -1
METHOD: SNELLEN - LINEAR
OD_SC: CF@4FT
OD_PH_SC: 20/300
CORRECTION_TYPE: GLASSES

## 2022-06-22 ASSESSMENT — TONOMETRY
OD_IOP_MMHG: 17
IOP_METHOD: TONOPEN
OS_IOP_MMHG: 20

## 2022-06-22 ASSESSMENT — REFRACTION_WEARINGRX
OD_SPHERE: -2.00
OS_AXIS: 090
OS_CYLINDER: +0.25
OS_SPHERE: -2.50
OD_CYLINDER: SPHERE

## 2022-06-22 ASSESSMENT — SLIT LAMP EXAM - LIDS
COMMENTS: NORMAL
COMMENTS: NORMAL

## 2022-06-22 ASSESSMENT — EXTERNAL EXAM - LEFT EYE: OS_EXAM: NORMAL

## 2022-06-22 ASSESSMENT — EXTERNAL EXAM - RIGHT EYE: OD_EXAM: NORMAL

## 2022-06-22 ASSESSMENT — CUP TO DISC RATIO: OD_RATIO: 0.35

## 2022-06-22 NOTE — PATIENT INSTRUCTIONS
No heavy lifting   Jones shield at night   Retina detachment and endophthalmitis precautions were discussed with the patient and was asked to return if any of the those occur     Medications to operative eye  Maxitrol (tapa harrison) Three times a day x 1 week, then twice a day x 1 week and then once a day till finish  Maxitrol oint at bedtime  Atropine (tapa brina) stop   artificial tears three times a day or As needed   cosopt (tapa angelique) twice a day      Follow up in 3 weeks with optos and Optical Coherence Tomography

## 2022-06-22 NOTE — NURSING NOTE
"Chief Complaints and History of Present Illnesses   Patient presents with     Post Op (Ophthalmology) Right Eye     Pt here for 1 week status post 25 g Pars plana vitrectomy (PPV)/ endolaser/ membrane peel/ air fluid exchange/ Silicone Oil 1000 cs for 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.     Chief Complaint(s) and History of Present Illness(es)     Post Op (Ophthalmology) Right Eye     Laterality: right eye    Associated symptoms: eye pain.  Negative for headache, flashes and floaters    Comments: Pt here for 1 week status post 25 g Pars plana vitrectomy (PPV)/ endolaser/ membrane peel/ air fluid exchange/ Silicone Oil 1000 cs for 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.              Comments     Pt notes eye is stable vision right eye since last exam. Pt notes eye pain \"it might be dryness\". No problems with drops.  She has a \"vine like floater, in central vision.\"  Pt using:  Maxitrol (tapa harrison) four times a day    Maxitrol oint at bedtime  Atropine (tapa brina) once a day   cosopt (tapa angelique) twice a day   HELENE RUIZ 2:38 PM June 22, 2022                     "

## 2022-06-22 NOTE — PROGRESS NOTES
Postoperative week 1 status post 25 g Pars plana vitrectomy (PPV)/ endolaser/ membrane peel/ air fluid exchange/ Silicone Oil 1000 cs for 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     Retina attached  Doing well  intraocular pressure 17    Plan:  No heavy lifting   Jones shield at night   Retina detachment and endophthalmitis precautions were discussed with the patient and was asked to return if any of the those occur    Medications to operative eye  Maxitrol (tapa harrison) Three times a day x 1 week, then twice a day x 1 week and then once a day till finish  Maxitrol oint at bedtime  Atropine (tapa brina) stop   artificial tears three times a day or As needed   cosopt (tapa angelique) twice a day     Follow up in 3 weeks with optos and Optical Coherence Tomography  [facility] with PH and PENELOPE  ~~~~~~~~~~~~~~~~~~~~~~~~~~~~~~~~~~   Complete documentation of historical and exam elements from today's encounter can be found in the full encounter summary report (not reduplicated in this progress note).  I personally obtained the chief complaint(s) and history of present illness.  I confirmed and edited as necessary the review of systems, past medical/surgical history, family history, social history, and examination findings as documented by others; and I examined the patient myself.  I personally reviewed the relevant tests, images, and reports as documented above.  I formulated and edited as necessary the assessment and plan and discussed the findings and management plan with the patient and family    Emmy Murphy MD  .  Retina Service   Department of Ophthalmology and Visual Neurosciences   [facility]  Phone: [phone]   Fax: [phone]

## 2022-07-07 DIAGNOSIS — E11.3592 PROLIFERATIVE DIABETIC RETINOPATHY OF LEFT EYE WITHOUT MACULAR EDEMA ASSOCIATED WITH TYPE 2 DIABETES MELLITUS (H): Primary | ICD-10-CM

## 2022-07-13 ENCOUNTER — OFFICE VISIT (OUTPATIENT)
Dept: OPHTHALMOLOGY | Facility: CLINIC | Age: 42
End: 2022-07-13
Attending: OPHTHALMOLOGY
Payer: MEDICAID

## 2022-07-13 DIAGNOSIS — E11.3521 RIGHT EYE AFFECTED BY PROLIFERATIVE DIABETIC RETINOPATHY WITH TRACTION RETINAL DETACHMENT INVOLVING MACULA, ASSOCIATED WITH TYPE 2 DIABETES MELLITUS (H): ICD-10-CM

## 2022-07-13 DIAGNOSIS — H43.12 VITREOUS HEMORRHAGE OF LEFT EYE (H): ICD-10-CM

## 2022-07-13 DIAGNOSIS — Z48.810 AFTERCARE FOLLOWING SURGERY OF A SENSORY ORGAN: ICD-10-CM

## 2022-07-13 DIAGNOSIS — E11.3592 PROLIFERATIVE DIABETIC RETINOPATHY OF LEFT EYE WITHOUT MACULAR EDEMA ASSOCIATED WITH TYPE 2 DIABETES MELLITUS (H): Primary | ICD-10-CM

## 2022-07-13 PROCEDURE — 99024 POSTOP FOLLOW-UP VISIT: CPT | Mod: GC | Performed by: STUDENT IN AN ORGANIZED HEALTH CARE EDUCATION/TRAINING PROGRAM

## 2022-07-13 PROCEDURE — G0463 HOSPITAL OUTPT CLINIC VISIT: HCPCS | Mod: 25

## 2022-07-13 PROCEDURE — 92134 CPTRZ OPH DX IMG PST SGM RTA: CPT | Performed by: OPHTHALMOLOGY

## 2022-07-13 PROCEDURE — 92134 CPTRZ OPH DX IMG PST SGM RTA: CPT | Mod: 26 | Performed by: STUDENT IN AN ORGANIZED HEALTH CARE EDUCATION/TRAINING PROGRAM

## 2022-07-13 ASSESSMENT — REFRACTION_WEARINGRX
OS_AXIS: 090
OS_CYLINDER: +0.25
OD_SPHERE: -2.00
OS_SPHERE: -2.50
OD_CYLINDER: SPHERE

## 2022-07-13 ASSESSMENT — VISUAL ACUITY
METHOD: SNELLEN - LINEAR
CORRECTION_TYPE: GLASSES
OS_CC+: -2
OD_CC: 3/200 E
OD_PH_CC: 20/400
OS_CC: 20/40

## 2022-07-13 ASSESSMENT — SLIT LAMP EXAM - LIDS
COMMENTS: NORMAL
COMMENTS: NORMAL

## 2022-07-13 ASSESSMENT — CONF VISUAL FIELD
OD_INFERIOR_NASAL_RESTRICTION: 1
OS_NORMAL: 1
OD_SUPERIOR_NASAL_RESTRICTION: 3
METHOD: COUNTING FINGERS
OD_INFERIOR_TEMPORAL_RESTRICTION: 1
OD_SUPERIOR_TEMPORAL_RESTRICTION: 3

## 2022-07-13 ASSESSMENT — CUP TO DISC RATIO
OS_RATIO: 0.3
OD_RATIO: 0.35

## 2022-07-13 ASSESSMENT — TONOMETRY
IOP_METHOD: TONOPEN
OD_IOP_MMHG: 17
OS_IOP_MMHG: 17

## 2022-07-13 ASSESSMENT — EXTERNAL EXAM - RIGHT EYE: OD_EXAM: NORMAL

## 2022-07-13 ASSESSMENT — EXTERNAL EXAM - LEFT EYE: OS_EXAM: NORMAL

## 2022-07-13 NOTE — PROGRESS NOTES
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    -Retina detachment and endophthalmitis precautions were discussed with the patient and was asked to return if any of the those occur    Continue Medications to operative eye  Maxitrol (tapa harrison) stop  Atropine (tapa brina) stop   artificial tears three times a day or As needed     cosopt (tapa angelique) twice a day   Maxitrol oint at bedtime    A/P     # history of proliferative diabetic retinopathy   Status post 25 g Pars plana vitrectomy (PPV)/ endolaser/ membrane peel/ air fluid exchange/ Silicone Oil 1000 cs for 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  I personally obtained the chief complaint(s) and history of present illness.  I confirmed and edited as necessary the review of systems, past medical/surgical history, family history, social history, and examination findings as documented by others; and I examined the patient myself.  I personally reviewed the relevant tests, images, and reports as documented above.  I formulated and edited as necessary the assessment and plan and  discussed the findings and management plan with the patient and family    Emmy Murphy MD  Professor of Ophthalmology  Vitreo-Retinal surgeon   Department of Ophthalmology and Visual Neurosciences   [facility]  Phone: [phone]   Fax: [phone]

## 2022-07-13 NOTE — NURSING NOTE
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  No eye pain today. No new flashes or floaters. Pt still seeing floaters in LE, no changes.  DM2 BS: 120 two days ago per pt.  A1C: 7.5 taken about 1 year ago per pt.  No results found for: A1C    Anastasiya Holman [facility] July 13, 2022 12:46 PM

## 2022-07-29 DIAGNOSIS — E11.3592 PROLIFERATIVE DIABETIC RETINOPATHY OF LEFT EYE WITHOUT MACULAR EDEMA ASSOCIATED WITH TYPE 2 DIABETES MELLITUS (H): Primary | ICD-10-CM

## 2022-09-09 ENCOUNTER — TELEPHONE (OUTPATIENT)
Dept: OPHTHALMOLOGY | Facility: CLINIC | Age: 42
End: 2022-09-09

## 2022-09-09 NOTE — TELEPHONE ENCOUNTER
Mikayla's daughter called to reschedule a missed post-op appointment with Dr. Emmy Murphy.     There is no consent to communicate on file, I told Mikayla's daughter that I cannot discuss her mother's care without consent to communicate and she said she understood. I offered to call her mother with an  today and asked what time would be best to reach her, daughter said after 3:00 PM would be best. I will call after 3:00 PM today with an .

## 2022-11-09 ENCOUNTER — OFFICE VISIT (OUTPATIENT)
Dept: OPHTHALMOLOGY | Facility: CLINIC | Age: 42
End: 2022-11-09
Attending: OPHTHALMOLOGY
Payer: MEDICAID

## 2022-11-09 DIAGNOSIS — E11.3592 PROLIFERATIVE DIABETIC RETINOPATHY OF LEFT EYE WITHOUT MACULAR EDEMA ASSOCIATED WITH TYPE 2 DIABETES MELLITUS (H): ICD-10-CM

## 2022-11-09 PROCEDURE — 67228 TREATMENT X10SV RETINOPATHY: CPT | Mod: LT | Performed by: OPHTHALMOLOGY

## 2022-11-09 PROCEDURE — 92134 CPTRZ OPH DX IMG PST SGM RTA: CPT | Performed by: OPHTHALMOLOGY

## 2022-11-09 PROCEDURE — G0463 HOSPITAL OUTPT CLINIC VISIT: HCPCS | Mod: 25

## 2022-11-09 ASSESSMENT — CONF VISUAL FIELD
OD_INFERIOR_NASAL_RESTRICTION: 3
OD_SUPERIOR_TEMPORAL_RESTRICTION: 3
OD_SUPERIOR_NASAL_RESTRICTION: 1
OS_INFERIOR_TEMPORAL_RESTRICTION: 0
OS_SUPERIOR_NASAL_RESTRICTION: 0
OS_NORMAL: 1
METHOD: COUNTING FINGERS
OS_INFERIOR_NASAL_RESTRICTION: 0
OD_INFERIOR_TEMPORAL_RESTRICTION: 3
OS_SUPERIOR_TEMPORAL_RESTRICTION: 0

## 2022-11-09 ASSESSMENT — EXTERNAL EXAM - LEFT EYE: OS_EXAM: NORMAL

## 2022-11-09 ASSESSMENT — SLIT LAMP EXAM - LIDS
COMMENTS: NORMAL
COMMENTS: NORMAL

## 2022-11-09 ASSESSMENT — REFRACTION_WEARINGRX
OS_AXIS: 090
OD_SPHERE: -2.00
OS_SPHERE: -2.50
OD_CYLINDER: SPHERE
OS_CYLINDER: +0.25

## 2022-11-09 ASSESSMENT — VISUAL ACUITY
OS_CC: 20/30
OD_CC: CF @ 3'
OS_CC+: -1
CORRECTION_TYPE: GLASSES
METHOD: SNELLEN - LINEAR

## 2022-11-09 ASSESSMENT — CUP TO DISC RATIO
OS_RATIO: 0.3
OD_RATIO: 0.35

## 2022-11-09 ASSESSMENT — TONOMETRY
OD_IOP_MMHG: 16
IOP_METHOD: ICARE
OS_IOP_MMHG: 16

## 2022-11-09 ASSESSMENT — EXTERNAL EXAM - RIGHT EYE: OD_EXAM: NORMAL

## 2022-11-09 NOTE — NURSING NOTE
Chief Complaints and History of Present Illnesses   Patient presents with     Diabetic Retinopathy Follow Up     S/P RIGHT EYE VITRECTOMY, PARS PLANA APPROACH, USING 25-GAUGE INSTRUMENTS / membrane peel / endolaser / oil - DOS 6/14/2022     Chief Complaint(s) and History of Present Illness(es)     Diabetic Retinopathy Follow Up            Laterality: both eyes    Onset: unknown    Course: stable    Associated symptoms: Negative for eye pain, flashes and floaters    Treatments tried: no treatments    Pain scale: 0/10    Comments: S/P RIGHT EYE VITRECTOMY, PARS PLANA APPROACH, USING 25-GAUGE INSTRUMENTS / membrane peel / endolaser / oil - DOS 6/14/2022          Comments    Pt here today for PDR follow up.  States eyes are the same as last visit.  Pt denies any pain or discomfort.    Ocular meds = none    Hellen NARAYAN, JERRY 3:07 PM 11/09/2022

## 2022-11-09 NOTE — PROGRESS NOTES
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    ASSESSMENT:   # history of DMII  # proliferative diabetic retinopathy both eyes   right eye: history of Tractional retinal detachment   Status post 25 g Pars plana vitrectomy (PPV)/ endolaser/ membrane peel/ air fluid exchange/ Silicone Oil 1000 cs for 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 Patient agreed to proceed with surgery.    # PDR left eye  # inf Vitreous Hemorrhage left eye  -S/p PRP 2/17/22 and 6/8/22  persistent neovascularization elsewhere   Sleep with head of bed elevated, avoid NSAIDs and aspirin when possible   Plan for Panretinal laser photocoagulation (PRP) filling for persistent proliferative diabetic retinopathy 11/09/22   Consider avastin inj once insurance approval     #Cataract both eyes  Observe for now    PLAN:   -Retina detachment and endophthalmitis precautions were discussed with the patient and was asked to return if any of the those occur  - plan to schedule surgery right eye as above  cosopt (tapa angelique) twice a day   Follow up in one month or post-op  ~~~~~~~~~~~~~~~~~~~~~~~~~~~~~~~~~~   Complete documentation  of historical and exam elements from today's encounter can be found in the full encounter summary report (not reduplicated in this progress note).  I personally obtained the chief complaint(s) and history of present illness.  I confirmed and edited as necessary the review of systems, past medical/surgical history, family history, social history, and examination findings as documented by others; and I examined the patient myself.  I personally reviewed the relevant tests, images, and reports as documented above.  I formulated and edited as necessary the assessment and plan and discussed the findings and management plan with the patient and family    Emmy Murphy MD  Professor of Ophthalmology  Vitreo-Retinal surgeon   Department of Ophthalmology and Visual Neurosciences   [facility]  Phone: [phone]   Fax: [phone]

## 2022-11-16 ENCOUNTER — TELEPHONE (OUTPATIENT)
Dept: OPHTHALMOLOGY | Facility: CLINIC | Age: 42
End: 2022-11-16

## 2022-11-16 NOTE — TELEPHONE ENCOUNTER
Called with  to schedule surgery with Dr. Emmy Murphy, I left a voicemail with callback # [phone]

## 2022-11-29 DIAGNOSIS — E11.3592 PROLIFERATIVE DIABETIC RETINOPATHY OF LEFT EYE WITHOUT MACULAR EDEMA ASSOCIATED WITH TYPE 2 DIABETES MELLITUS (H): Primary | ICD-10-CM

## 2022-12-02 ENCOUNTER — TELEPHONE (OUTPATIENT)
Dept: OPHTHALMOLOGY | Facility: CLINIC | Age: 42
End: 2022-12-02

## 2022-12-02 NOTE — TELEPHONE ENCOUNTER
I called patient to schedule surgery with Dr. Emmy Murphy, I left a voicemail with callback # [phone]

## 2022-12-07 ENCOUNTER — OFFICE VISIT (OUTPATIENT)
Dept: OPHTHALMOLOGY | Facility: CLINIC | Age: 42
End: 2022-12-07
Attending: OPHTHALMOLOGY
Payer: MEDICAID

## 2022-12-07 DIAGNOSIS — E11.3592 PROLIFERATIVE DIABETIC RETINOPATHY OF LEFT EYE WITHOUT MACULAR EDEMA ASSOCIATED WITH TYPE 2 DIABETES MELLITUS (H): ICD-10-CM

## 2022-12-07 PROCEDURE — G0463 HOSPITAL OUTPT CLINIC VISIT: HCPCS | Mod: 25

## 2022-12-07 PROCEDURE — 92134 CPTRZ OPH DX IMG PST SGM RTA: CPT | Performed by: OPHTHALMOLOGY

## 2022-12-07 PROCEDURE — 99214 OFFICE O/P EST MOD 30 MIN: CPT | Mod: GC | Performed by: OPHTHALMOLOGY

## 2022-12-07 ASSESSMENT — CONF VISUAL FIELD
OS_SUPERIOR_NASAL_RESTRICTION: 0
OD_INFERIOR_NASAL_RESTRICTION: 3
OD_SUPERIOR_TEMPORAL_RESTRICTION: 3
OS_INFERIOR_TEMPORAL_RESTRICTION: 0
OD_SUPERIOR_NASAL_RESTRICTION: 3
OS_SUPERIOR_TEMPORAL_RESTRICTION: 0
METHOD: COUNTING FINGERS
OS_INFERIOR_NASAL_RESTRICTION: 0
OS_NORMAL: 1

## 2022-12-07 ASSESSMENT — SLIT LAMP EXAM - LIDS
COMMENTS: NORMAL
COMMENTS: NORMAL

## 2022-12-07 ASSESSMENT — VISUAL ACUITY
OS_PH_CC: 20/30
OS_CC+: -2
OD_CC: 3/200 E
OS_PH_CC+: +3
CORRECTION_TYPE: GLASSES
METHOD: SNELLEN - LINEAR
OS_CC: 20/30

## 2022-12-07 ASSESSMENT — CUP TO DISC RATIO
OD_RATIO: 0.35
OS_RATIO: 0.3

## 2022-12-07 ASSESSMENT — REFRACTION_WEARINGRX
OD_CYLINDER: SPHERE
OS_SPHERE: -2.50
OD_SPHERE: -2.00
OS_AXIS: 090
OS_CYLINDER: +0.25

## 2022-12-07 ASSESSMENT — EXTERNAL EXAM - RIGHT EYE: OD_EXAM: NORMAL

## 2022-12-07 ASSESSMENT — TONOMETRY
OS_IOP_MMHG: 12
OD_IOP_MMHG: 12
IOP_METHOD: TONOPEN

## 2022-12-07 ASSESSMENT — EXTERNAL EXAM - LEFT EYE: OS_EXAM: NORMAL

## 2022-12-07 NOTE — H&P (VIEW-ONLY)
CC: follow up proliferative diabetic retinopathy follow up   Status post Tractional retinal detachment  Repair right eye   Interval: Overall she says her vision is the same as last time. She does not have any pain. She is off of all medicated drops and is just using artificial tears.     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 6.14.22    OCT 12/7/22  Right Eye: preserved contour with trace ERM - stable  Left Eye: trace ERM, otherwise preserved foveal contour and thickness, trace IRF - stable    ASSESSMENT:   # history of DMII  # proliferative diabetic retinopathy both eyes   right eye: history of Tractional retinal detachment   Status post 25 g Pars plana vitrectomy (PPV)/ endolaser/ membrane peel/ air fluid exchange/ Silicone Oil 1000 cs for 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 Patient agreed to proceed with surgery.   - not yet scheduled    # PDR left eye  # inf Vitreous Hemorrhage left eye  -S/p PRP 2/17/22 and 6/8/22  persistent neovascularization elsewhere   Sleep with head of bed elevated, avoid NSAIDs and aspirin when possible   S/p Panretinal laser photocoagulation (PRP) filling for persistent proliferative diabetic retinopathy on 11/09/22   Consider avastin inj once insurance approval     # Cataract both  eyes  Observe for now    PLAN:   -Retina detachment and endophthalmitis precautions were discussed with the patient and was asked to return if any of the those occur  - plan to schedule surgery right eye as above    Follow up post-op    Leisa Connors MD  Ophthalmology Resident, PGY-3  [facility]    ~~~~~~~~~~~~~~~~~~~~~~~~~~~~~~~~~~   Complete documentation of historical and exam elements from today's encounter can be found in the full encounter summary report (not reduplicated in this progress note).  I personally obtained the chief complaint(s) and history of present illness.  I confirmed and edited as necessary the review of systems, past medical/surgical history, family history, social history, and examination findings as documented by others; and I examined the patient myself.  I personally reviewed the relevant tests, images, and reports as documented above.  I personally reviewed the ophthalmic test(s) associated with this encounter, agree with the interpretation(s) as documented by the resident/fellow, and have edited the corresponding report(s) as necessary.   I formulated and edited as necessary the assessment and plan and discussed the findings and management plan with the patient and family    Emmy Murphy MD  Professor of Ophthalmology  Vitreo-Retinal surgeon   Department of Ophthalmology and Visual Neurosciences   [facility]  Phone: [phone]   Fax: [phone]

## 2022-12-07 NOTE — NURSING NOTE
Chief Complaints and History of Present Illnesses   Patient presents with     Follow Up     Proliferative diabetic retinopathy of left eye without macular edema associated with type 2 diabetes mellitus (PRP left eye on 11/09/2022)     Chief Complaint(s) and History of Present Illness(es)     Follow Up            Laterality: left eye    Course: stable    Associated symptoms: dryness.  Negative for eye pain, flashes and floaters    Treatments tried: artificial tears    Pain scale: 0/10    Comments: Proliferative diabetic retinopathy of left eye without macular edema associated with type 2 diabetes mellitus (PRP left eye on 11/09/2022)          Comments    She states that her vision has seemed stable in both eyes since her last eye exam.  Both eyes seem intermittently dry.  Using artificial tears does help the discomfort.     No results found for: A1C    RORO Martines 3:16 PM  December 7, 2022

## 2022-12-07 NOTE — PROGRESS NOTES
CC: follow up proliferative diabetic retinopathy follow up   Status post Tractional retinal detachment  Repair right eye   Interval: Overall she says her vision is the same as last time. She does not have any pain. She is off of all medicated drops and is just using artificial tears.     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 6.14.22    OCT 12/7/22  Right Eye: preserved contour with trace ERM - stable  Left Eye: trace ERM, otherwise preserved foveal contour and thickness, trace IRF - stable    ASSESSMENT:   # history of DMII  # proliferative diabetic retinopathy both eyes   right eye: history of Tractional retinal detachment   Status post 25 g Pars plana vitrectomy (PPV)/ endolaser/ membrane peel/ air fluid exchange/ Silicone Oil 1000 cs for 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 Patient agreed to proceed with surgery.   - not yet scheduled    # PDR left eye  # inf Vitreous Hemorrhage left eye  -S/p PRP 2/17/22 and 6/8/22  persistent neovascularization elsewhere   Sleep with head of bed elevated, avoid NSAIDs and aspirin when possible   S/p Panretinal laser photocoagulation (PRP) filling for persistent proliferative diabetic retinopathy on 11/09/22   Consider avastin inj once insurance approval     # Cataract both  eyes  Observe for now    PLAN:   -Retina detachment and endophthalmitis precautions were discussed with the patient and was asked to return if any of the those occur  - plan to schedule surgery right eye as above    Follow up post-op    Leisa Connors MD  Ophthalmology Resident, PGY-3  [facility]    ~~~~~~~~~~~~~~~~~~~~~~~~~~~~~~~~~~   Complete documentation of historical and exam elements from today's encounter can be found in the full encounter summary report (not reduplicated in this progress note).  I personally obtained the chief complaint(s) and history of present illness.  I confirmed and edited as necessary the review of systems, past medical/surgical history, family history, social history, and examination findings as documented by others; and I examined the patient myself.  I personally reviewed the relevant tests, images, and reports as documented above.  I personally reviewed the ophthalmic test(s) associated with this encounter, agree with the interpretation(s) as documented by the resident/fellow, and have edited the corresponding report(s) as necessary.   I formulated and edited as necessary the assessment and plan and discussed the findings and management plan with the patient and family    Emmy Murphy MD  Professor of Ophthalmology  Vitreo-Retinal surgeon   Department of Ophthalmology and Visual Neurosciences   [facility]  Phone: [phone]   Fax: [phone]

## 2022-12-07 NOTE — Clinical Note
Plan for 25 g Pars plana vitrectomy (PPV), Silicone Oil removal; possible endolaser; air fluid exchange  right eye  CSC60 min Peribulbar block Interpretor needed Time: next 2 weeks

## 2022-12-08 ENCOUNTER — TELEPHONE (OUTPATIENT)
Dept: OPHTHALMOLOGY | Facility: CLINIC | Age: 42
End: 2022-12-08

## 2022-12-08 PROBLEM — E11.3592: Status: ACTIVE | Noted: 2022-12-08

## 2022-12-08 NOTE — TELEPHONE ENCOUNTER
I called Perlita with  to schedule surgery with Dr. Emmy Murphy, I left a voicemail with callback # [phone]

## 2022-12-12 ENCOUNTER — TELEPHONE (OUTPATIENT)
Dept: OPHTHALMOLOGY | Facility: CLINIC | Age: 42
End: 2022-12-12

## 2022-12-12 ENCOUNTER — ANESTHESIA EVENT (OUTPATIENT)
Dept: SURGERY | Facility: AMBULATORY SURGERY CENTER | Age: 42
End: 2022-12-12

## 2022-12-12 NOTE — TELEPHONE ENCOUNTER
Called patient to confirm she is planning on surgery tomorrow. She is planning on coming. She has not had an H&P yet. She has not had her a1c checked for about 6mo. She has an appt scheduled for DM checkup in 1/2023. Last a1c was between 7 and 7.9.     Called [facility] and spoke with anesthesia staff about her. The case is planned as monitored care. She did well with her last surgery. It will be appropriate to complete her H&P tomorrow.      Josh Chin MD  Vitreoretinal Surgical Fellow, PGY6  [facility]

## 2022-12-13 ENCOUNTER — ANESTHESIA (OUTPATIENT)
Dept: SURGERY | Facility: AMBULATORY SURGERY CENTER | Age: 42
End: 2022-12-13

## 2022-12-13 ENCOUNTER — HOSPITAL ENCOUNTER (OUTPATIENT)
Facility: AMBULATORY SURGERY CENTER | Age: 42
Discharge: HOME | End: 2022-12-13
Attending: OPHTHALMOLOGY
Payer: MEDICAID

## 2022-12-13 VITALS
SYSTOLIC BLOOD PRESSURE: 167 MMHG | DIASTOLIC BLOOD PRESSURE: 97 MMHG | HEART RATE: 94 BPM | OXYGEN SATURATION: 98 % | RESPIRATION RATE: 16 BRPM | HEIGHT: 59 IN | TEMPERATURE: 97.2 F | BODY MASS INDEX: 44.35 KG/M2 | WEIGHT: 220 LBS

## 2022-12-13 DIAGNOSIS — E11.3521 RIGHT EYE AFFECTED BY PROLIFERATIVE DIABETIC RETINOPATHY WITH TRACTION RETINAL DETACHMENT INVOLVING MACULA, ASSOCIATED WITH TYPE 2 DIABETES MELLITUS (H): Primary | ICD-10-CM

## 2022-12-13 DIAGNOSIS — E11.3592 PROLIFERATIVE DIABETIC RETINOPATHY OF LEFT EYE WITHOUT MACULAR EDEMA ASSOCIATED WITH TYPE 2 DIABETES MELLITUS (H): ICD-10-CM

## 2022-12-13 DIAGNOSIS — E11.3521 RIGHT EYE AFFECTED BY PROLIFERATIVE DIABETIC RETINOPATHY WITH TRACTION RETINAL DETACHMENT INVOLVING MACULA, ASSOCIATED WITH TYPE 2 DIABETES MELLITUS (H): ICD-10-CM

## 2022-12-13 LAB
GLUCOSE BLDC GLUCOMTR-MCNC: 186 MG/DL (ref 70–99)
GLUCOSE BLDC GLUCOMTR-MCNC: 257 MG/DL (ref 70–99)
HCG UR QL: NEGATIVE
INTERNAL QC OK POCT: NORMAL
POCT KIT EXPIRATION DATE: NORMAL
POCT KIT LOT NUMBER: NORMAL

## 2022-12-13 PROCEDURE — 81025 URINE PREGNANCY TEST: CPT | Performed by: PATHOLOGY

## 2022-12-13 PROCEDURE — 67121 REMOVE EYE IMPLANT MATERIAL: CPT | Mod: RT | Performed by: OPHTHALMOLOGY

## 2022-12-13 PROCEDURE — 67036 REMOVAL OF INNER EYE FLUID: CPT

## 2022-12-13 PROCEDURE — 67121 REMOVE EYE IMPLANT MATERIAL: CPT | Mod: RT

## 2022-12-13 PROCEDURE — 82962 GLUCOSE BLOOD TEST: CPT | Mod: 91 | Performed by: PATHOLOGY

## 2022-12-13 RX ORDER — OXYCODONE HYDROCHLORIDE 5 MG/1
10 TABLET ORAL EVERY 4 HOURS PRN
Status: DISCONTINUED | OUTPATIENT
Start: 2022-12-13 | End: 2022-12-14 | Stop reason: HOSPADM

## 2022-12-13 RX ORDER — FENTANYL CITRATE 50 UG/ML
25 INJECTION, SOLUTION INTRAMUSCULAR; INTRAVENOUS
Status: DISCONTINUED | OUTPATIENT
Start: 2022-12-13 | End: 2022-12-14 | Stop reason: HOSPADM

## 2022-12-13 RX ORDER — PROPOFOL 10 MG/ML
INJECTION, EMULSION INTRAVENOUS PRN
Status: DISCONTINUED | OUTPATIENT
Start: 2022-12-13 | End: 2022-12-13

## 2022-12-13 RX ORDER — HYDROMORPHONE HYDROCHLORIDE 1 MG/ML
0.2 INJECTION, SOLUTION INTRAMUSCULAR; INTRAVENOUS; SUBCUTANEOUS EVERY 5 MIN PRN
Status: DISCONTINUED | OUTPATIENT
Start: 2022-12-13 | End: 2022-12-14 | Stop reason: HOSPADM

## 2022-12-13 RX ORDER — PROPARACAINE HYDROCHLORIDE 5 MG/ML
1 SOLUTION/ DROPS OPHTHALMIC ONCE
Status: COMPLETED | OUTPATIENT
Start: 2022-12-13 | End: 2022-12-13

## 2022-12-13 RX ORDER — SODIUM CHLORIDE, SODIUM LACTATE, POTASSIUM CHLORIDE, CALCIUM CHLORIDE 600; 310; 30; 20 MG/100ML; MG/100ML; MG/100ML; MG/100ML
INJECTION, SOLUTION INTRAVENOUS CONTINUOUS
Status: DISCONTINUED | OUTPATIENT
Start: 2022-12-13 | End: 2022-12-14 | Stop reason: HOSPADM

## 2022-12-13 RX ORDER — ONDANSETRON 4 MG/1
4 TABLET, ORALLY DISINTEGRATING ORAL EVERY 30 MIN PRN
Status: DISCONTINUED | OUTPATIENT
Start: 2022-12-13 | End: 2022-12-14 | Stop reason: HOSPADM

## 2022-12-13 RX ORDER — ONDANSETRON 2 MG/ML
INJECTION INTRAMUSCULAR; INTRAVENOUS PRN
Status: DISCONTINUED | OUTPATIENT
Start: 2022-12-13 | End: 2022-12-13

## 2022-12-13 RX ORDER — LIDOCAINE 40 MG/G
CREAM TOPICAL
Status: DISCONTINUED | OUTPATIENT
Start: 2022-12-13 | End: 2022-12-14 | Stop reason: HOSPADM

## 2022-12-13 RX ORDER — LIDOCAINE HYDROCHLORIDE 20 MG/ML
INJECTION, SOLUTION INFILTRATION; PERINEURAL PRN
Status: DISCONTINUED | OUTPATIENT
Start: 2022-12-13 | End: 2022-12-13

## 2022-12-13 RX ORDER — MEPERIDINE HYDROCHLORIDE 25 MG/ML
12.5 INJECTION INTRAMUSCULAR; INTRAVENOUS; SUBCUTANEOUS
Status: DISCONTINUED | OUTPATIENT
Start: 2022-12-13 | End: 2022-12-14 | Stop reason: HOSPADM

## 2022-12-13 RX ORDER — FENTANYL CITRATE 50 UG/ML
50 INJECTION, SOLUTION INTRAMUSCULAR; INTRAVENOUS EVERY 5 MIN PRN
Status: DISCONTINUED | OUTPATIENT
Start: 2022-12-13 | End: 2022-12-14 | Stop reason: HOSPADM

## 2022-12-13 RX ORDER — FENTANYL CITRATE 50 UG/ML
INJECTION, SOLUTION INTRAMUSCULAR; INTRAVENOUS PRN
Status: DISCONTINUED | OUTPATIENT
Start: 2022-12-13 | End: 2022-12-13

## 2022-12-13 RX ORDER — CYCLOPENTOLAT/TROPIC/PHENYLEPH 1%-1%-2.5%
1 DROPS (EA) OPHTHALMIC (EYE)
Status: COMPLETED | OUTPATIENT
Start: 2022-12-13 | End: 2022-12-13

## 2022-12-13 RX ORDER — ATROPINE SULFATE 10 MG/ML
SOLUTION/ DROPS OPHTHALMIC PRN
Status: DISCONTINUED | OUTPATIENT
Start: 2022-12-13 | End: 2022-12-13 | Stop reason: HOSPADM

## 2022-12-13 RX ORDER — NEOMYCIN POLYMYXIN B SULFATES AND DEXAMETHASONE 3.5; 10000; 1 MG/ML; [USP'U]/ML; MG/ML
1 SUSPENSION/ DROPS OPHTHALMIC 4 TIMES DAILY
Qty: 5 ML | Refills: 1 | Status: SHIPPED | OUTPATIENT
Start: 2022-12-13 | End: 2024-04-25

## 2022-12-13 RX ORDER — DEXAMETHASONE SODIUM PHOSPHATE 4 MG/ML
INJECTION, SOLUTION INTRA-ARTICULAR; INTRALESIONAL; INTRAMUSCULAR; INTRAVENOUS; SOFT TISSUE PRN
Status: DISCONTINUED | OUTPATIENT
Start: 2022-12-13 | End: 2022-12-13 | Stop reason: HOSPADM

## 2022-12-13 RX ORDER — BALANCED SALT SOLUTION 6.4; .75; .48; .3; 3.9; 1.7 MG/ML; MG/ML; MG/ML; MG/ML; MG/ML; MG/ML
SOLUTION OPHTHALMIC PRN
Status: DISCONTINUED | OUTPATIENT
Start: 2022-12-13 | End: 2022-12-13 | Stop reason: HOSPADM

## 2022-12-13 RX ORDER — OXYCODONE HYDROCHLORIDE 5 MG/1
5 TABLET ORAL EVERY 4 HOURS PRN
Status: DISCONTINUED | OUTPATIENT
Start: 2022-12-13 | End: 2022-12-14 | Stop reason: HOSPADM

## 2022-12-13 RX ORDER — HYDROMORPHONE HYDROCHLORIDE 1 MG/ML
0.4 INJECTION, SOLUTION INTRAMUSCULAR; INTRAVENOUS; SUBCUTANEOUS EVERY 5 MIN PRN
Status: DISCONTINUED | OUTPATIENT
Start: 2022-12-13 | End: 2022-12-14 | Stop reason: HOSPADM

## 2022-12-13 RX ORDER — ACETAMINOPHEN 325 MG/1
975 TABLET ORAL ONCE
Status: COMPLETED | OUTPATIENT
Start: 2022-12-13 | End: 2022-12-13

## 2022-12-13 RX ORDER — FENTANYL CITRATE 50 UG/ML
25 INJECTION, SOLUTION INTRAMUSCULAR; INTRAVENOUS EVERY 5 MIN PRN
Status: DISCONTINUED | OUTPATIENT
Start: 2022-12-13 | End: 2022-12-14 | Stop reason: HOSPADM

## 2022-12-13 RX ORDER — TETRACAINE HYDROCHLORIDE 5 MG/ML
SOLUTION OPHTHALMIC PRN
Status: DISCONTINUED | OUTPATIENT
Start: 2022-12-13 | End: 2022-12-13 | Stop reason: HOSPADM

## 2022-12-13 RX ORDER — ONDANSETRON 2 MG/ML
4 INJECTION INTRAMUSCULAR; INTRAVENOUS EVERY 30 MIN PRN
Status: DISCONTINUED | OUTPATIENT
Start: 2022-12-13 | End: 2022-12-14 | Stop reason: HOSPADM

## 2022-12-13 RX ADMIN — SODIUM CHLORIDE, SODIUM LACTATE, POTASSIUM CHLORIDE, CALCIUM CHLORIDE: 600; 310; 30; 20 INJECTION, SOLUTION INTRAVENOUS at 09:56

## 2022-12-13 RX ADMIN — PROPARACAINE HYDROCHLORIDE 1 DROP: 5 SOLUTION/ DROPS OPHTHALMIC at 09:53

## 2022-12-13 RX ADMIN — Medication 1 DROP: at 09:53

## 2022-12-13 RX ADMIN — PROPOFOL 60 MG: 10 INJECTION, EMULSION INTRAVENOUS at 11:35

## 2022-12-13 RX ADMIN — Medication 1 DROP: at 10:06

## 2022-12-13 RX ADMIN — Medication 1 DROP: at 09:59

## 2022-12-13 RX ADMIN — ACETAMINOPHEN 975 MG: 325 TABLET ORAL at 09:55

## 2022-12-13 RX ADMIN — FENTANYL CITRATE 25 MCG: 50 INJECTION, SOLUTION INTRAMUSCULAR; INTRAVENOUS at 11:51

## 2022-12-13 RX ADMIN — ONDANSETRON 4 MG: 2 INJECTION INTRAMUSCULAR; INTRAVENOUS at 11:37

## 2022-12-13 RX ADMIN — FENTANYL CITRATE 50 MCG: 50 INJECTION, SOLUTION INTRAMUSCULAR; INTRAVENOUS at 11:34

## 2022-12-13 RX ADMIN — FENTANYL CITRATE 25 MCG: 50 INJECTION, SOLUTION INTRAMUSCULAR; INTRAVENOUS at 12:03

## 2022-12-13 RX ADMIN — LIDOCAINE HYDROCHLORIDE 100 MG: 20 INJECTION, SOLUTION INFILTRATION; PERINEURAL at 11:34

## 2022-12-13 NOTE — DISCHARGE INSTRUCTIONS
"POST-OPERATIVE INSTRUCTIONS FOLLOWING SURGERY    Emmy Murphy MD  Department of Ophthalmology  [facility]  [phone]    FOLLOW UP:  You have a follow up appointment tomorrow at the [facility] 9th floor.      EYE DROPS  Drops will be given to you after the surgery.  They DO NOT need to be used until after you are seen in clinic.  DO NOT disturb your bandage the first night.      When using more than one drop, separate them by 3 minutes between drops.  Common times to place drops are breakfast, lunch, dinner, and bedtime.  Do not stop your drops without discussing with our office.  If you run out before your appointment, call and we will send in a refill.    Maxitrol Drops  --- 4 times per day    ACTIVITY:  No heavy lifting after surgery  Keep the bandage in place until you are seen tomorrow   Do not get your bandage wet    PAIN MEDICATION   It is common to have some mild or moderate discomfort after eye surgery.  Tylenol or ibuprofen may be taken if you don't have any other general health conditions that prevent you from taking these.    WHAT TO EXPECT  It is common for the eye to to have a blood tinged discharge for a few days after surgery  It may feel irritated (as if something were in your eye), for there to be clear discharge (thicker in the mornings upon awakening), and for it to be bloodshot for 2-3 weeks following retina surgery.       WHAT TO WATCH OUT FOR  If you experience any of the following \"RSVP Symptoms\", you should call immediately:  Worsening Redness  Worsening Sensitivity to light  Worsening Vision, including new flashing lights or floaters  Worsening Pain, including nausea/vomiting      For any of the symptoms listed above, or for other concerns, call [phone] and ask to speak to the clinic nurse.  If you call after hours, follow to prompts to reach the doctor on call.              [facility]  Home Care Following " Anesthesia  For 24 hours after surgery:  Get plenty of rest.  A responsible adult must stay with you for at least 24 hours after you leave the surgery center.  Do not drive or use heavy equipment.  If you have weakness or tingling, don't drive or use heavy equipment until this feeling goes away.   Do not drink alcohol.   Avoid strenuous or risky activities.  Ask for help when climbing stairs.  You may feel lightheaded.  IF so, sit for a few minutes before standing.  Have someone help you get up.   If you have nausea (feel sick to your stomach): Drink only clear liquids such as apple juice, ginger ale, broth or 7-Up.  Rest may also help.  Be sure to drink enough fluids.  Move to a regular diet as you feel able.   You may have a slight fever.  Call the doctor if your fever is over 100 F (37.7 C) (taken under the tongue) or lasts longer than 24 hours.  You may have a dry mouth, a sore throat, muscle aches or trouble sleeping. These should go away after 24 hours.  Do not make important or legal decisions.   It is recommended to avoid smoking.               Tips for taking pain medications  To get the best pain relief possible, remember these points:  Take pain medications as directed, before pain becomes severe.  Pain medication can upset your stomach: taking it with food may help.  Constipation is a common side effect of pain medication. Drink plenty of  fluids.  Eat foods high in fiber. Take a stool softener if recommended by your doctor or pharmacist.  Do not drink alcohol, drive or operate machinery while taking pain medications.  Ask about other ways to control pain, such as with heat, ice or relaxation.    Tylenol/Acetaminophen Consumption  To help encourage the safe use of acetaminophen, the makers of TYLENOL  have lowered the maximum daily dose for single-ingredient Extra Strength TYLENOL  (acetaminophen) products sold in the [place] from 8 pills per day (4,000 mg) to 6 pills per day (3,000 mg). The dosing interval  has also changed from 2 pills every 4-6 hours to 2 pills every 6 hours.  If you feel your pain relief is insufficient, you may take Tylenol/Acetaminophen in addition to your narcotic pain medication.   Be careful not to exceed 3,000 mg of Tylenol/Acetaminophen in a 24 hour period from all sources.  If you are taking extra strength Tylenol/acetaminophen (500 mg), the maximum dose is 6 tablets in 24 hours.  If you are taking regular strength acetaminophen (325 mg), the maximum dose is 9 tablets in 24 hours.    Call a doctor for any of the following:  Signs of infection (fever, growing tenderness at the surgery site, a large amount of drainage or bleeding, severe pain, foul-smelling drainage, redness, swelling).  It has been over 8 to 10 hours since surgery and you are still not able to urinate (pass water).  Headache for over 24 hours.  Numbness, tingling or weakness the day after surgery (if you had spinal anesthesia).  Signs of Covid-19 infection (temperature over 100 degrees, shortness of breath, cough, loss of taste/smell, generalized body aches, persistent headache, chills, sore throat, nausea/vomiting/diarrhea)  Your doctor is:       Dr. Emmy Murphy, Ophthalmology: [phone]               Or dial [phone] and ask for the resident on call for:  Ophthalmology  For emergency care, call the:  [facility]:  [phone] (TTY for hearing impaired: [phone])

## 2022-12-13 NOTE — BRIEF OP NOTE
[facility] [place]    Brief Operative Note    Pre-operative diagnosis: Proliferative diabetic retinopathy of left eye without macular edema associated with type 2 diabetes mellitus (H) [E11.3592]  Post-operative diagnosis Same as pre-operative diagnosis    Procedure: Procedure(s):  RIGHT EYE VITRECTOMY, PARS PLANA APPROACH, USING 25-GAUGE INSTRUMENTS / silicone oil removal / endolaser / air fluid exchange  Surgeon: Surgeon(s) and Role:     * Emmy Murphy MD - Primary     * Abhilash Perez MD - Fellow - Assisting  Anesthesia: MAC with Block   Estimated Blood Loss: Minimal    Drains: None  Specimens: * No specimens in log *  Findings:   None.  Complications: None.  Implants:   Implant Name Type Inv. Item Serial No.  Lot No. LRB No. Used Action   EYE ROMANA OIL 1000WT 8.5ML EDVIN [id number] - [id number] Lens/Eye Implant EYE ROMANA OIL 1000WT 8.5ML EDVIN [id number]  [employer]VIN LABS [id number] Right 1 Explanted         Leisa Connors MD  Ophthalmology Resident, PGY-3  [facility]

## 2022-12-13 NOTE — H&P
HISTORY AND PHYSICAL  12/13/22    Patient: Perlita Day  YOB: 1980  Reason for Encounter: Pre-op H&P for surgery      HISTORY OF PRESENTING ILLNESS:     Perlita Day is a 42 year old female with a history of type 2 diabetes on insulin who presents for silicone oil removal surgery in the right eye. She has not seen her PCP in the past week so she is undergoing a pre-op history and physical in the pre-op suite. She usually takes insulin and metformin for her diabetes but since she ran out two weeks ago and has not been able to get a refill yet she has not taken any insulin in the last two weeks. She says she has a history of high blood pressure at the doctor's office but it is always normal (130 systolic) when she checks it at home.     She denies fever, headache, runny nose, congestion, cough, sore throat, chest pain, abdominal pain, numbness, tingling, swelling in the extremities, rash, current injuries or wounds, recent illness including URI, or history of sleep apnea. 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     Review of systems were otherwise negative except for that which has been stated above.      PAST MEDICAL HISTORY:     Past Medical History:  Past Medical History:   Diagnosis Date     Diabetic retinopathy associated with diabetes mellitus due to underlying condition (H)      Social History:  She does not smoke cigarettes or chew tobacco. She does not drink alcohol.     Past Surgical History:  Past Surgical History:   Procedure Laterality Date     CHOLECYSTECTOMY  2003     HI LAP,DIAGNOSTIC ABDOMEN Right 7/11/2018    Procedure: LAPAROSCOPY, RIGHT SALPINGO OOPHORECTOMY, LYSIS OF ADHESIONS;  Surgeon: Steven Barajas MD;  [place] [facility] [place]  Service: Gynecology     VITRECTOMY PARSPLANA WITH 25 GAUGE SYSTEM Right 6/14/2022    Procedure: RIGHT EYE VITRECTOMY, PARS PLANA APPROACH, USING 25-GAUGE INSTRUMENTS /  "membrane peel / endolaser / oil;  Surgeon: Emmy Murphy MD;  Location: [facility] OR     Systemic Medications:  Metformin  Insulin (20 Units nightly)    No inhalers, vitamins, or supplements  EXAMINATION:     BP (!) 200/103   Pulse 97   Temp 96.8  F (36  C) (Temporal)   Resp 18   Ht 1.5 m (4' 11.06\")   Wt 99.8 kg (220 lb)   SpO2 97%   BMI 44.35 kg/m      Exam:  Constitutional: healthy, alert and no distress  Head: Normocephalic. No masses, lesions, or abnormalities  Neck: Neck supple, normal size  Cardiovascular: negative, PMI normal. No lifts, heaves, or thrills. RRR. No murmurs, clicks gallops or rub  Respiratory: negative, Percussion normal. Good diaphragmatic excursion. Lungs clear  Gastrointestinal: Abdomen soft, non-tender. BS normal. No masses, organomegaly  : Deferred  Musculoskeletal: extremities normal- no gross deformities noted  Skin: no suspicious lesions or rashes  Neurologic: Negative  Psychiatric: mentation appears normal and affect normal/bright  Hematologic/Lymphatic/Immunologic: Negative      Labs/Studies/Imaging Performed:  Glucose 257 on finger stick     ASSESSMENT/PLAN:     Perlita Day is a 42 year old female who presents with     # Pre op history and physical  - her blood sugar is elevated today due to not having her insulin for the past two weeks (she had a refill prescribed but has not picked it up yet, PCP follow up scheduled in early January 2023)  - her blood pressure is severely elevated today, she endorses a history of hypertension in doctors offices but normal blood pressure at home   - anesthesia to decide if it is ok to proceed with surgery today    - urged patient to follow up with PCP for evaluation and medical management of hypertension  - she has clear lung sounds, denies respiratory illness symptoms, and no history of JERRY or asthma  - she does not have any allergies      It is our pleasure to participate in this patient's care and treatment. Please " contact us with any further questions or concerns.    Seen and discussed with Dr. Murphy.      Leisa Connors MD  Ophthalmology Resident, PGY-3

## 2022-12-13 NOTE — OP NOTE
Surgeon: Emmy Murphy MD  Fellow: Abhilash Perez MD, MPH  Resident: Leisa Connors MD  PREOPERATIVE DIAGNOSES:   1. Status post retinal detachment repair with silicone oil, RIGHT EYE.   POSTOPERATIVE DIAGNOSES:   1. Status post retinal detachment repair with silicone oil, RIGHT EYE  PROCEDURES:   1. 25 gauge pars plana vitrectomy, silicone oil removal, endolaser, air-fluid exchange. RIGHT EYE  ANESTHESIA: MAC with Peribulbar Block.   COMPLICATIONS: None.   ESTIMATED BLOOD LOSS: Scant.   INDICATIONS: Perlita Day is a 42 year old patient with a history of retinal detachment repair with silicone oil, RIGHT EYE. The patient has silicone oil in the vitreous cavity. The patient is here for Silicone Oil removal.  DESCRIPTION OF PROCEDURE: The patient was taken to the operating room where general anesthesia was administered by the Anesthesia Department.   The patient's eye was prepped and draped in the usual sterile surgical fashion for ophthalmic surgery, including instillation of 1 drop of 5% povidone iodine. A sterile drape was placed over the face and body and a lid speculum was inserted. The microscope was brought into the operative field.   A 25-gauge trocar was placed inferotemporally 4.0 mm posterior to the limbus and the infusion cannula was connected and its intravitreal location was verified by direct visualization. Two other trocars were placed superior nasal and superior temporally. The Biom was used to assist with the view during the vitrectomy. The silicone aspiration cannula was placed superotemporally and the silicone oil was removed without complication. After the silicone oil removal, the vitrector handpiece and endoilluminator were placed in the eye. Upon entering the eye, it was noted that the patient had peripheral laser scars and small dot hemes peripherally. The retina was attached. The optic nerve looks pink. Next, endolaser was applied to fill in periphery.  An air-fluid  exchange was performed. The retina remained attached.    Next, the trocars were removed and the sclerotomies were closed with 6-0  Plain gut sutures. Subconjunctival injections of Ancef and dexamethasone were administered. The lid speculum was removed. The eye was cleaned with wet and dry gauze. Maxitrol ointment was applied to the eye. The eye was cleaned with wet and dry gauze and an eye patch and a Jones shield were placed over the left eye. The patient was discharged to the postoperative care unit in stable condition, having tolerated the procedure well.   The surgery was assisted by Dr. Mani Perez. Due to the delicate and complex nature of this surgery, an assistant was required. He assisted with vitrectomy. I was present for the entire surgery.

## 2022-12-13 NOTE — INTERVAL H&P NOTE
"I have reviewed the surgical (or preoperative) H&P that is linked to this encounter, and examined the patient. There are no significant changes    Clinical Conditions Present on Arrival:  Clinically Significant Risk Factors Present on Admission                    # Severe Obesity: Estimated body mass index is 44.35 kg/m  as calculated from the following:    Height as of this encounter: 1.5 m (4' 11.06\").    Weight as of this encounter: 99.8 kg (220 lb).       "

## 2022-12-13 NOTE — ANESTHESIA PREPROCEDURE EVALUATION
Anesthesia Pre-Procedure Evaluation    Patient: Perlita Day   MRN: [id number] : 1980        Procedure : Procedure(s):  RIGHT EYE VITRECTOMY, PARS PLANA APPROACH, USING 25-GAUGE INSTRUMENTS / silicone oil removal / endolaser / air fluid exchange          Past Medical History:   Diagnosis Date     Diabetic retinopathy associated with diabetes mellitus due to underlying condition (H)       Past Surgical History:   Procedure Laterality Date     CHOLECYSTECTOMY       HI LAP,DIAGNOSTIC ABDOMEN Right 2018    Procedure: LAPAROSCOPY, RIGHT SALPINGO OOPHORECTOMY, LYSIS OF ADHESIONS;  Surgeon: Steven Barajas MD;  [facility] [place];  Service: Gynecology     VITRECTOMY PARSPLANA WITH 25 GAUGE SYSTEM Right 2022    Procedure: RIGHT EYE VITRECTOMY, PARS PLANA APPROACH, USING 25-GAUGE INSTRUMENTS / membrane peel / endolaser / oil;  Surgeon: Emmy Murphy MD;  [place] [facility] [place]      No Known Allergies   Social History     Tobacco Use     Smoking status: Never     Smokeless tobacco: Never   Substance Use Topics     Alcohol use: No      Wt Readings from Last 1 Encounters:   22 99.8 kg (220 lb)        Anesthesia Evaluation            ROS/MED HX  ENT/Pulmonary:  - neg pulmonary ROS     Neurologic:  - neg neurologic ROS     Cardiovascular:  - neg cardiovascular ROS     METS/Exercise Tolerance:     Hematologic:  - neg hematologic  ROS     Musculoskeletal:       GI/Hepatic:  - neg GI/hepatic ROS     Renal/Genitourinary:       Endo:     (+) type II DM, Using insulin, - not using insulin pump. Normal glucose range: 250, not previously admitted for DM/DKA.     Psychiatric/Substance Use:  - neg psychiatric ROS     Infectious Disease:       Malignancy:       Other:               OUTSIDE LABS:  CBC:   Lab Results   Component Value Date    WBC 8.6 2018    HGB 12.4 2018    HGB 11.6 (L) 2018    HCT 35.1 2018     2018     BMP:   Lab Results    Component Value Date     06/05/2018    POTASSIUM 4.1 06/05/2018    CHLORIDE 102 06/05/2018    CO2 24 06/05/2018    BUN 14 06/05/2018    CR 0.73 06/05/2018     (H) 12/13/2022     (H) 06/14/2022     COAGS: No results found for: PTT, INR, FIBR  POC:   Lab Results   Component Value Date    HCG Negative 12/13/2022     HEPATIC: No results found for: ALBUMIN, PROTTOTAL, ALT, AST, GGT, ALKPHOS, BILITOTAL, BILIDIRECT, NICHOLAS  OTHER:   Lab Results   Component Value Date    BRITTNEE 9.2 06/05/2018    CRP 0.5 06/05/2018       Anesthesia Plan    ASA Status:  3      Anesthesia Type: MAC.     - Reason for MAC: immobility needed, straight local not clinically adequate              Consents    Anesthesia Plan(s) and associated risks, benefits, and realistic alternatives discussed. Questions answered and patient/representative(s) expressed understanding.     - Discussed: Risks, Benefits and Alternatives for BOTH SEDATION and the PROCEDURE were discussed     - Discussed with:  Patient      - Extended Intubation/Ventilatory Support Discussed: No.      - Patient is DNR/DNI Status: No    Use of blood products discussed: No .     Postoperative Care    Pain management: IV analgesics, intrathecal morphine.   PONV prophylaxis: Ondansetron (or other 5HT-3), Aprepitant     Comments:                Guille Barajas MD, MD

## 2022-12-13 NOTE — OR NURSING
Notified Dr. Barajas due to patient's BP of 200/103 and patient's report of not taking insulin for the past 2 weeks due to not having a refill prescription. Pt reports she is scheduled to see her PCP in January. Educated patient that she needs to see her PCP ASAP to restart her Insulin and discuss her high blood pressure. Orders for IV Versed to see if hypertension is related to anxiety. Upon recheck of 1mg Versed pt's BP is 172/89.

## 2022-12-13 NOTE — ANESTHESIA POSTPROCEDURE EVALUATION
Patient: Perlita Day    Procedure: Procedure(s):  RIGHT EYE VITRECTOMY, PARS PLANA APPROACH, USING 25-GAUGE INSTRUMENTS / silicone oil removal / endolaser / air fluid exchange       Anesthesia Type:  MAC    Note:  Disposition: Outpatient   Postop Pain Control: Uneventful            Sign Out: Well controlled pain   PONV: No   Neuro/Psych: Uneventful            Sign Out: Acceptable/Baseline neuro status   Airway/Respiratory: Uneventful            Sign Out: Acceptable/Baseline resp. status   CV/Hemodynamics: Uneventful            Sign Out: Acceptable CV status; No obvious hypovolemia; No obvious fluid overload   Other NRE: NONE   DID A NON-ROUTINE EVENT OCCUR? No           Last vitals:  Vitals Value Taken Time   /97 12/13/22 1300   Temp 36.2  C (97.2  F) 12/13/22 1300   Pulse     Resp 16 12/13/22 1300   SpO2 98 % 12/13/22 1300       Electronically Signed By: Guille Barajas MD, MD  December 13, 2022  1:51 PM

## 2022-12-13 NOTE — ANESTHESIA CARE TRANSFER NOTE
Patient: Perlita Day    Procedure: Procedure(s):  RIGHT EYE VITRECTOMY, PARS PLANA APPROACH, USING 25-GAUGE INSTRUMENTS / silicone oil removal / endolaser / air fluid exchange       Diagnosis: Proliferative diabetic retinopathy of left eye without macular edema associated with type 2 diabetes mellitus (H) [E11.3592]  Diagnosis Additional Information: No value filed.    Anesthesia Type:   MAC     Note:    Oropharynx: oropharynx clear of all foreign objects  Level of Consciousness: awake  Oxygen Supplementation: room air    Independent Airway: airway patency satisfactory and stable  Dentition: dentition unchanged  Vital Signs Stable: post-procedure vital signs reviewed and stable  Report to RN Given: handoff report given  Patient transferred to: Phase II  Comments: VSS and WNL, comfortable, no PONV, report to Cindi GRANT  Handoff Report: Identifed the Patient, Identified the Reponsible Provider, Reviewed the pertinent medical history, Discussed the surgical course, Reviewed Intra-OP anesthesia mangement and issues during anesthesia, Set expectations for post-procedure period and Allowed opportunity for questions and acknowledgement of understanding      Vitals:  Vitals Value Taken Time   BP     Temp     Pulse     Resp     SpO2         Electronically Signed By: AVELINO Rodgers CRNA  December 13, 2022  12:22 PM

## 2022-12-15 ENCOUNTER — OFFICE VISIT (OUTPATIENT)
Dept: OPHTHALMOLOGY | Facility: CLINIC | Age: 42
End: 2022-12-15
Attending: OPHTHALMOLOGY
Payer: MEDICAID

## 2022-12-15 ENCOUNTER — TELEPHONE (OUTPATIENT)
Dept: OPHTHALMOLOGY | Facility: CLINIC | Age: 42
End: 2022-12-15

## 2022-12-15 DIAGNOSIS — Z48.810 AFTERCARE FOLLOWING SURGERY OF A SENSORY ORGAN: Primary | ICD-10-CM

## 2022-12-15 PROCEDURE — G0463 HOSPITAL OUTPT CLINIC VISIT: HCPCS

## 2022-12-15 PROCEDURE — 99024 POSTOP FOLLOW-UP VISIT: CPT | Performed by: OPHTHALMOLOGY

## 2022-12-15 ASSESSMENT — EXTERNAL EXAM - LEFT EYE: OS_EXAM: NORMAL

## 2022-12-15 ASSESSMENT — VISUAL ACUITY
OS_CC+: -2
OD_SC: 20/500
METHOD: SNELLEN - LINEAR
OS_CC: 20/30
OS_PH_CC: 20/30
OS_PH_CC+: +2

## 2022-12-15 ASSESSMENT — EXTERNAL EXAM - RIGHT EYE: OD_EXAM: NORMAL

## 2022-12-15 ASSESSMENT — TONOMETRY
OS_IOP_MMHG: 12
IOP_METHOD: TONOPEN
OD_IOP_MMHG: 07

## 2022-12-15 ASSESSMENT — REFRACTION_WEARINGRX
OD_CYLINDER: SPHERE
OS_AXIS: 090
OD_SPHERE: -2.00
OS_CYLINDER: +0.25
OS_SPHERE: -2.50

## 2022-12-15 ASSESSMENT — SLIT LAMP EXAM - LIDS
COMMENTS: NORMAL
COMMENTS: NORMAL

## 2022-12-15 ASSESSMENT — CUP TO DISC RATIO
OS_RATIO: 0.3
OD_RATIO: 0.35

## 2022-12-15 NOTE — NURSING NOTE
Chief Complaints and History of Present Illnesses   Patient presents with     Photophobia Right Eye     RIGHT EYE VITRECTOMY, PARS PLANA APPROACH, USING 25-GAUGE INSTRUMENTS / silicone oil removal / endolaser / air fluid exchange 12/13/22     Chief Complaint(s) and History of Present Illness(es)     Photophobia Right Eye            Comments: RIGHT EYE VITRECTOMY, PARS PLANA APPROACH, USING 25-GAUGE INSTRUMENTS / silicone oil removal / endolaser / air fluid exchange 12/13/22          Comments    Removed patch  Pt states eye pain 2/10 on the pain scale    Shauna Silverio COT 12:16 PM December 15, 2022                      8

## 2022-12-15 NOTE — PROGRESS NOTES
CC: follow up surgery   Status post 25g Pars plana vitrectomy /silicone oil removal / endolaser / air fluid exchange (Right) 12.13.22  History of Tractional retinal detachment repair with Silicone Oil right eye   Interval: no eye pain; no flashes and floaters     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 6.14.22    OCT 12/7/22  Right Eye: preserved contour with trace ERM - stable  Left Eye: trace ERM, otherwise preserved foveal contour and thickness, trace IRF - stable    ASSESSMENT:   # POD2 status post  25g Pars plana vitrectomy /silicone oil removal / endolaser / air fluid exchange (Right) 12.13.22  Retina attached  Doing well     # history of DMII  # proliferative diabetic retinopathy both eyes   right eye: history of Tractional retinal detachment   Status post 25 g Pars plana vitrectomy (PPV)/ endolaser/ membrane peel/ air fluid exchange/ Silicone Oil 1000 cs for 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 6.14.22  Retina attached; peripheral laser scars   # PDR left eye  # inf Vitreous Hemorrhage left eye  -S/p PRP 2/17/22 and 6/8/22; 11/09/22   Consider avastin inj once insurance approval     # Cataract both eyes  Observe for now    PLAN:   Follow up post-op next week  Position: any  No heavy lifting   Jones shield at all times  Retina detachment and endophthalmitis precautions were discussed with the patient and was asked to return if any of the those occur    Medications to operative eye  Maxitrol (pink top) four times a day    Maxitrol oint at bedtime  Atropine (red top) once a day   ~~~~~~~~~~~~~~~~~~~~~~~~~~~~~~~~~~   Complete documentation of historical and exam elements from today's encounter can be found in the full encounter summary report (not reduplicated in this progress note).  I personally  obtained the chief complaint(s) and history of present illness.  I confirmed and edited as necessary the review of systems, past medical/surgical history, family history, social history, and examination findings as documented by others; and I examined the patient myself.  I personally reviewed the relevant tests, images, and reports as documented above.  I formulated and edited as necessary the assessment and plan and discussed the findings and management plan with the patient and family    Emmy Murphy MD  Professor of Ophthalmology  Vitreo-Retinal surgeon   Department of Ophthalmology and Visual Neurosciences   [facility]  Phone: [phone]   Fax: [phone]

## 2022-12-15 NOTE — PATIENT INSTRUCTIONS
Follow up post-op next week  Position: any  No heavy lifting   Jones shield at all times  Retina detachment and endophthalmitis precautions were discussed with the patient and was asked to return if any of the those occur    Medications to operative eye  Maxitrol (pink top) four times a day    Maxitrol oint at bedtime  Atropine (red top) once a day

## 2022-12-15 NOTE — TELEPHONE ENCOUNTER
Mikayla called about scheduling post-op appointments. Scheduled for this afternoon with Dr. Emmy Murphy.

## 2022-12-19 ENCOUNTER — OFFICE VISIT (OUTPATIENT)
Dept: OPHTHALMOLOGY | Facility: CLINIC | Age: 42
End: 2022-12-19
Attending: OPHTHALMOLOGY
Payer: MEDICAID

## 2022-12-19 DIAGNOSIS — Z48.810 AFTERCARE FOLLOWING SURGERY OF A SENSORY ORGAN: Primary | ICD-10-CM

## 2022-12-19 PROCEDURE — 99024 POSTOP FOLLOW-UP VISIT: CPT | Mod: GC | Performed by: OPHTHALMOLOGY

## 2022-12-19 PROCEDURE — G0463 HOSPITAL OUTPT CLINIC VISIT: HCPCS

## 2022-12-19 RX ORDER — PREDNISOLONE ACETATE 10 MG/ML
1-2 SUSPENSION/ DROPS OPHTHALMIC 3 TIMES DAILY
Qty: 10 ML | Refills: 3 | Status: SHIPPED | OUTPATIENT
Start: 2022-12-19 | End: 2024-04-25

## 2022-12-19 ASSESSMENT — CONF VISUAL FIELD
OD_INFERIOR_NASAL_RESTRICTION: 0
OS_NORMAL: 1
OS_SUPERIOR_TEMPORAL_RESTRICTION: 0
OD_SUPERIOR_TEMPORAL_RESTRICTION: 3
OS_INFERIOR_NASAL_RESTRICTION: 0
OD_INFERIOR_TEMPORAL_RESTRICTION: 0
OD_SUPERIOR_NASAL_RESTRICTION: 0
OS_INFERIOR_TEMPORAL_RESTRICTION: 0
METHOD: COUNTING FINGERS
OS_SUPERIOR_NASAL_RESTRICTION: 0

## 2022-12-19 ASSESSMENT — VISUAL ACUITY
OS_PH_CC: 20/20
OS_CC: 20/40
CORRECTION_TYPE: GLASSES
OS_CC+: -1
OD_PH_SC: 20/300
OS_PH_CC+: -1
OD_SC: 20/500
METHOD: SNELLEN - LINEAR
OD_CC: 1000

## 2022-12-19 ASSESSMENT — TONOMETRY
IOP_METHOD: TONOPEN
OS_IOP_MMHG: 14
OD_IOP_MMHG: 12

## 2022-12-19 ASSESSMENT — REFRACTION_WEARINGRX
OS_SPHERE: -2.50
OD_SPHERE: -2.00
OD_CYLINDER: SPHERE
OS_AXIS: 090
OS_CYLINDER: +0.25

## 2022-12-19 ASSESSMENT — SLIT LAMP EXAM - LIDS
COMMENTS: NORMAL
COMMENTS: NORMAL

## 2022-12-19 ASSESSMENT — EXTERNAL EXAM - LEFT EYE: OS_EXAM: NORMAL

## 2022-12-19 ASSESSMENT — EXTERNAL EXAM - RIGHT EYE: OD_EXAM: NORMAL

## 2022-12-19 ASSESSMENT — CUP TO DISC RATIO: OD_RATIO: 0.35

## 2022-12-19 NOTE — PROGRESS NOTES
CC: follow up surgery  Status post 25g Pars plana vitrectomy /silicone oil removal / endolaser / air fluid exchange (Right) 12.13.22    History of Tractional retinal detachment repair with Silicone Oil right eye     Interval: She has right eye pain and a right sided headache all the time. Tylenol only works for a few hours and then the headache comes back. She says her vision is betting better. Sometimes it feels like there is something in the eye. She is using the maxitrol drops Qid, maxitrol ointment nightly, and atropine daily as directed in the right eye.     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 6.14.22    OCT 12/7/22  Right Eye: preserved contour with trace ERM - stable  Left Eye: trace ERM, otherwise preserved foveal contour and thickness, trace IRF - stable    ASSESSMENT:   # POW1 status post  25g Pars plana vitrectomy / silicone oil removal / endolaser / air fluid exchange (Right) 12.13.22  IOP good (12), however she is having eye pain and a headache  Subjective improvement in vision  Reports pain to R side  Localized injection at sclerotomy ports  Recommend eyedrops as below  Recommend adding artificial tears  As needed   Recommend ibuprofen 400 mg twice a day x 5 days    # history of DMII  # proliferative diabetic retinopathy both eyes   right eye: history of Tractional retinal detachment   Status post 25 g Pars plana vitrectomy (PPV)/ endolaser/ membrane peel/ air fluid exchange/ Silicone Oil 1000 cs for 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 6.14.22  Retina attached; peripheral laser scars       # PDR left eye  # inf Vitreous Hemorrhage left eye  -S/p PRP 2/17/22 and 6/8/22; 11/09/22   Consider avastin inj once insurance approval     # Cataract both eyes  Observe for now    PLAN:   Ok to  resume normal activities  Retina detachment and endophthalmitis precautions were discussed with the patient and was asked to return if any of the those occur    Use the following medications to operative eye (right eye)  Maxitrol (pink top) four times a day  X 1 week, the three times per day for 1 week, two times per day for 1 week, daily for 1 week, then stop.  If patient runs out maxitrol ok to continue just Predforte    Maxitrol oint at bedtime  Atropine (red top) stop  ibuprofen 400 mg twice a day x 5 days  artificial tears  As needed   Follow up in 3-4 weeks    Leisa Connors MD  Ophthalmology Resident, PGY-3  [facility]    ~~~~~~~~~~~~~~~~~~~~~~~~~~~~~~~~~~   Complete documentation of historical and exam elements from today's encounter can be found in the full encounter summary report (not reduplicated in this progress note).  I personally obtained the chief complaint(s) and history of present illness.  I confirmed and edited as necessary the review of systems, past medical/surgical history, family history, social history, and examination findings as documented by others; and I examined the patient myself.  I personally reviewed the relevant tests, images, and reports as documented above.  I formulated and edited as necessary the assessment and plan and discussed the findings and management plan with the patient and family    Emmy Murphy MD  Professor of Ophthalmology  Vitreo-Retinal surgeon   Department of Ophthalmology and Visual Neurosciences   [facility]  Phone: [phone]   Fax: [phone]

## 2022-12-19 NOTE — PATIENT INSTRUCTIONS
Ok to resume normal activities  Retina detachment and endophthalmitis precautions were discussed with the patient and was asked to return if any of the those occur    Use the following medications to operative eye (right eye)  Maxitrol (pink top) four times a day  X 1 week, the three times per day for 1 week, two times per day for 1 week, daily for 1 week, then stop. If patient runs out maxitrol ok to continue just Predforte    Maxitrol oint at bedtime  Atropine (red top) stop  ibuprofen 400 mg twice a day x 5 days  artificial tears  As needed   Follow up in 3-4 weeks

## 2022-12-19 NOTE — NURSING NOTE
Chief Complaints and History of Present Illnesses   Patient presents with     Post Op (Ophthalmology) Right Eye     S/p PPV /silicone oil removal / endolaser / air fluid exchange (Right) 12.13.22     Chief Complaint(s) and History of Present Illness(es)     Post Op (Ophthalmology) Right Eye            Laterality: right eye    Onset: 4 days ago    Associated symptoms: Negative for dryness, redness and tearing    Pain scale: 5/10    Comments: S/p PPV /silicone oil removal / endolaser / air fluid exchange (Right) 12.13.22          Comments    Pt used  over the phone for today's visit.   Pt states her RE has been hurting and she is getting headaches.  The inside of her eye and around her RE hurts.   Pt sees floaters in her RE.     Ocular meds:  Maxitrol (pink top) four times a day  RE  Maxitrol oint at bedtime RE  Atropine (red top) once a day RE    DM2 BS:  No results found for: A1C     VALENTIN HERNANDEZ COA December 19, 2022 9:55 AM

## 2023-01-04 ENCOUNTER — TELEPHONE (OUTPATIENT)
Dept: OPHTHALMOLOGY | Facility: CLINIC | Age: 43
End: 2023-01-04

## 2023-01-11 ENCOUNTER — OFFICE VISIT (OUTPATIENT)
Dept: OPHTHALMOLOGY | Facility: CLINIC | Age: 43
End: 2023-01-11
Attending: OPHTHALMOLOGY
Payer: MEDICAID

## 2023-01-11 DIAGNOSIS — Z48.810 AFTERCARE FOLLOWING SURGERY OF A SENSORY ORGAN: Primary | ICD-10-CM

## 2023-01-11 PROCEDURE — G0463 HOSPITAL OUTPT CLINIC VISIT: HCPCS | Mod: 25

## 2023-01-11 PROCEDURE — 99024 POSTOP FOLLOW-UP VISIT: CPT | Performed by: OPHTHALMOLOGY

## 2023-01-11 ASSESSMENT — VISUAL ACUITY
OD_CC: 20/400
OS_PH_CC+: -2
OS_PH_CC: 20/25
OS_CC: 20/50
METHOD: SNELLEN - LINEAR
CORRECTION_TYPE: GLASSES

## 2023-01-11 ASSESSMENT — SLIT LAMP EXAM - LIDS
COMMENTS: NORMAL
COMMENTS: NORMAL

## 2023-01-11 ASSESSMENT — REFRACTION_WEARINGRX
OS_AXIS: 090
OD_SPHERE: -2.00
OD_CYLINDER: SPHERE
OS_SPHERE: -2.50
OS_CYLINDER: +0.25

## 2023-01-11 ASSESSMENT — CONF VISUAL FIELD
OS_SUPERIOR_TEMPORAL_RESTRICTION: 0
OS_SUPERIOR_NASAL_RESTRICTION: 0
OS_INFERIOR_NASAL_RESTRICTION: 0
OS_INFERIOR_TEMPORAL_RESTRICTION: 0
OD_SUPERIOR_TEMPORAL_RESTRICTION: 3
OS_NORMAL: 1
METHOD: COUNTING FINGERS

## 2023-01-11 ASSESSMENT — TONOMETRY
IOP_METHOD: TONOPEN
OD_IOP_MMHG: 17
OS_IOP_MMHG: 14

## 2023-01-11 ASSESSMENT — EXTERNAL EXAM - RIGHT EYE: OD_EXAM: NORMAL

## 2023-01-11 ASSESSMENT — EXTERNAL EXAM - LEFT EYE: OS_EXAM: NORMAL

## 2023-01-11 ASSESSMENT — CUP TO DISC RATIO: OD_RATIO: 0.35

## 2023-01-11 NOTE — NURSING NOTE
Chief Complaints and History of Present Illnesses   Patient presents with     Follow Up     Chief Complaint(s) and History of Present Illness(es)     Follow Up           Comments    Patient states that her vision has improved. No pain and irritation. No flashes of lights. She notices dark spots.     Ocular Meds: dorzolamide/ timolol daily in the      [facility] COT, January 11, 2023 2:55 PM

## 2023-01-11 NOTE — PROGRESS NOTES
CC: follow up surgery  Status post 25g Pars plana vitrectomy /silicone oil removal / endolaser / air fluid exchange (Right) 12.13.22    History of Tractional retinal detachment repair with Silicone Oil right eye     Interval: reports improvement of the vision right eye. No recurrent eye pain; VA slightly better    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 6.14.22  Patient using cosopt once a day     OCT 12/7/22  Right Eye: preserved contour with trace ERM - stable  Left Eye: trace ERM, otherwise preserved foveal contour and thickness, trace IRF - stable    ASSESSMENT:   # status post  25g Pars plana vitrectomy / silicone oil removal / endolaser / air fluid exchange (Right) 12.13.22  IOP good 17  Localized injection at sclerotomy ports- resolved    # history of DMII  # proliferative diabetic retinopathy both eyes   right eye: history of Tractional retinal detachment   Status post 25 g Pars plana vitrectomy (PPV)/ endolaser/ membrane peel/ air fluid exchange/ Silicone Oil 1000 cs for 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 6.14.22  Retina attached; peripheral laser scars     # PDR left eye  # inf Vitreous Hemorrhage left eye resolving  -S/p PRP 2/17/22 and 6/8/22; 11/09/22   Consider avastin inj once insurance approval     # Cataract both eyes  visually significant cataract right eye   Observe for now    PLAN:   Ok to resume normal activities  Stop cosopt   Stop Maxitrol oint at bedtime  artificial tears  As needed     Retina detachment and endophthalmitis precautions were discussed with the patient and was asked to return if any of the those occur  Follow up in 8 weeks with Optical Coherence Tomography and fluorescein angiography transits left eye + prescription + PENELOPE right eye   Possible cataract evaluation  right eye ; possible avastin injections    ~~~~~~~~~~~~~~~~~~~~~~~~~~~~~~~~~~   Complete documentation of historical and exam elements from today's encounter can be found in the full encounter summary report (not reduplicated in this progress note).  I personally obtained the chief complaint(s) and history of present illness.  I confirmed and edited as necessary the review of systems, past medical/surgical history, family history, social history, and examination findings as documented by others; and I examined the patient myself.  I personally reviewed the relevant tests, images, and reports as documented above.  I formulated and edited as necessary the assessment and plan and discussed the findings and management plan with the patient and family    Emmy Murphy MD  Professor of Ophthalmology  Vitreo-Retinal surgeon   Department of Ophthalmology and Visual Neurosciences   [facility]  Phone: [phone]   Fax: [phone]

## 2023-12-18 ENCOUNTER — NURSE TRIAGE (OUTPATIENT)
Dept: NURSING | Facility: CLINIC | Age: 43
End: 2023-12-18
Payer: MEDICAID

## 2023-12-18 NOTE — TELEPHONE ENCOUNTER
"  Nurse Triage SBAR    Is this a 2nd Level Triage? YES, LICENSED PRACTITIONER REVIEW IS REQUIRED    Situation: woke on Saturday with blurred vision and black spots to LEFT eye    Background:     RIGHT EYE VITRECTOMY, PARS PLANA APPROACH, USING 25-GAUGE INSTRUMENTS / silicone oil removal / endolaser / air fluid exchange on 12-    Assessment:   Patient woke up on Saturday with blurry vision and dark spots  Denies pain  Denies fall or injury  Denies drainage  Can't read a book, blurry or drive    Protocol Recommended Disposition:   Routing to EYE for a  call back        Routed to provider    Reason for Disposition   Blurred vision or visual changes and present now and sudden onset or new (e.g., minutes, hours, days)  (Exception: Seeing floaters / black specks OR previously diagnosed migraine headaches with same symptoms.)    Additional Information   Negative: Complete loss of vision in one or both eyes   Negative: SEVERE eye pain    Answer Assessment - Initial Assessment Questions  1. DESCRIPTION: \"How has your vision changed?\" (e.g., complete vision loss, blurred vision, double vision, floaters, etc.)      Woke up on Saturday and blurry vision with dark spots, cant drive  2. LOCATION: \"One or both eyes?\" If one, ask: \"Which eye?\"      Left eye  3. SEVERITY: \"Can you see anything?\" If Yes, ask: \"What can you see?\" (e.g., fine print)      Can't read a book, blurry vision  4. ONSET: \"When did this begin?\" \"Did it start suddenly or has this been gradual?\"      Saturday when she woke up  5. PATTERN: \"Does this come and go, or has it been constant since it started?\"      constant  6. PAIN: \"Is there any pain in your eye(s)?\"  (Scale 1-10; or mild, moderate, severe)    - NONE (0): No pain.    - MILD (1-3): Doesn't interfere with normal activities.    - MODERATE (4-7): Interferes with normal activities or awakens from sleep.     - SEVERE (8-10): Excruciating pain, unable to do any normal activities.      Denies " "pain  7. CONTACTS-GLASSES: \"Do you wear contacts or glasses?\"      glasses  8. CAUSE: \"What do you think is causing this visual problem?\"       Unknown    9. OTHER SYMPTOMS: \"Do you have any other symptoms?\" (e.g., confusion, headache, arm or leg weakness, speech problems)      unknown  10. PREGNANCY: \"Is there any chance you are pregnant?\" \"When was your last menstrual period?\"        no    Protocols used: Vision Loss or Change-A-OH    "

## 2023-12-19 ENCOUNTER — TELEPHONE (OUTPATIENT)
Dept: OPHTHALMOLOGY | Facility: CLINIC | Age: 43
End: 2023-12-19
Payer: MEDICAID

## 2023-12-19 ENCOUNTER — OFFICE VISIT (OUTPATIENT)
Dept: OPHTHALMOLOGY | Facility: CLINIC | Age: 43
End: 2023-12-19
Attending: OPHTHALMOLOGY
Payer: MEDICAID

## 2023-12-19 DIAGNOSIS — E11.3513 PROLIFERATIVE DIABETIC RETINOPATHY OF BOTH EYES WITH MACULAR EDEMA ASSOCIATED WITH TYPE 2 DIABETES MELLITUS (H): Primary | ICD-10-CM

## 2023-12-19 PROCEDURE — 92250 FUNDUS PHOTOGRAPHY W/I&R: CPT | Performed by: OPHTHALMOLOGY

## 2023-12-19 PROCEDURE — G0463 HOSPITAL OUTPT CLINIC VISIT: HCPCS | Mod: 25 | Performed by: OPHTHALMOLOGY

## 2023-12-19 PROCEDURE — 99214 OFFICE O/P EST MOD 30 MIN: CPT | Mod: 25 | Performed by: OPHTHALMOLOGY

## 2023-12-19 PROCEDURE — 67028 INJECTION EYE DRUG: CPT | Mod: LT | Performed by: OPHTHALMOLOGY

## 2023-12-19 PROCEDURE — 250N000011 HC RX IP 250 OP 636: Performed by: OPHTHALMOLOGY

## 2023-12-19 PROCEDURE — 99207 FUNDUS PHOTOS OU (BOTH EYES): CPT | Mod: 26 | Performed by: OPHTHALMOLOGY

## 2023-12-19 PROCEDURE — 92134 CPTRZ OPH DX IMG PST SGM RTA: CPT | Performed by: OPHTHALMOLOGY

## 2023-12-19 RX ADMIN — Medication 1.25 MG: at 09:54

## 2023-12-19 ASSESSMENT — TONOMETRY
IOP_METHOD: TONOPEN
OD_IOP_MMHG: 18
OS_IOP_MMHG: 16

## 2023-12-19 ASSESSMENT — CONF VISUAL FIELD
OS_INFERIOR_NASAL_RESTRICTION: 0
OD_SUPERIOR_NASAL_RESTRICTION: 3
OS_SUPERIOR_TEMPORAL_RESTRICTION: 0
OD_INFERIOR_TEMPORAL_RESTRICTION: 3
OS_NORMAL: 1
METHOD: COUNTING FINGERS
OD_INFERIOR_NASAL_RESTRICTION: 3
OS_INFERIOR_TEMPORAL_RESTRICTION: 0
OS_SUPERIOR_NASAL_RESTRICTION: 0
OD_SUPERIOR_TEMPORAL_RESTRICTION: 3

## 2023-12-19 ASSESSMENT — VISUAL ACUITY
OD_CC: 20/500
OS_CC: 20/60
CORRECTION_TYPE: GLASSES
METHOD: SNELLEN - LINEAR
OS_CC+: +1

## 2023-12-19 ASSESSMENT — REFRACTION_WEARINGRX
OS_SPHERE: -2.50
OD_CYLINDER: SPHERE
OD_SPHERE: -2.00
OS_CYLINDER: +0.25
OS_AXIS: 090

## 2023-12-19 ASSESSMENT — SLIT LAMP EXAM - LIDS
COMMENTS: NORMAL
COMMENTS: NORMAL

## 2023-12-19 ASSESSMENT — CUP TO DISC RATIO
OD_RATIO: 0.3
OS_RATIO: 0.3

## 2023-12-19 ASSESSMENT — EXTERNAL EXAM - LEFT EYE: OS_EXAM: NORMAL

## 2023-12-19 ASSESSMENT — EXTERNAL EXAM - RIGHT EYE: OD_EXAM: NORMAL

## 2023-12-19 NOTE — PROGRESS NOTES
CC -   PDR    INTERVAL HISTORY - Initial visit with TIGIST deras with  1/2023  New floaters OS since 12/16/23, VA worse, fluctuating  able to take care of self at home but unable to work currently    PMH -   Perlita Day is a  43 year old year-old patient with history of PDR OU s/p TRD/RRD repair OD in 2022 by   DM II since ~ 2010        PAST OCULAR SURGERY  PPV/EL/MS/SO OD 6/14/22 ()  PPR/SOR OD 12/13/22 ()  PRP OS 2022    RETINAL IMAGING:  OCT 12/19/2023  OD - diffuse thinning, DME temporal, mild ERM, avitric  ->245  OS - central DME, vit opacities, ->254      ASSESSMENT & PLAN    # PDR with DM II and DME OU   - s/p PPV OD and PRP OS   - regressed likely      # VH OS    - new onset 12/16/23   - OK at home but trouble working   - Avastin today 12/19/23    - may need PPV if doesn't clear quckly   - HOB elevated     - denies pregnancy     - r/b/a IVT anti-VEGF d/w patient   - infection, blindness, need for surgery   - off-label use of Avastin     - f/u next few weeks with     # DME OU   - mild NVS      # PVD OS   - likely based on exam      # NS OD >> OS   - will need surgery with          return to clinic:  few weeks with     ATTESTATION     Attending Attestation:     Complete documentation of historical and exam elements from today's encounter can be found in the full encounter summary report (not reduplicated in this progress note).  I personally obtained the chief complaint(s) and history of present illness.  I confirmed and edited as necessary the review of systems, past medical/surgical history, family history, social history, and examination findings as documented by others; and I examined the patient myself.  I personally reviewed the relevant tests, images, and reports as documented above.  I formulated and edited as necessary the assessment and plan and discussed the findings and management plan with the patient and family    Nisa Jimenez MD, PhD  Associate  Professor, Vitreoretinal Surgery  Department of Ophthalmology  [facility]

## 2023-12-19 NOTE — NURSING NOTE
"Chief Complaints and History of Present Illnesses   Patient presents with    Floaters Left Eye     Chief Complaint(s) and History of Present Illness(es)       Floaters Left Eye               Comments    Pt here with  over the phone today.  Decreased vision and increased floaters in Left eye since Saturday.   Floaters are worse by the evening. No eye pain today.   No flashes of light.  DM2 BS: 116 yesterday per pt.  No results found for: \"A1C\"    KAYLIN Villalobos December 19, 2023 7:25 AM                         "

## 2023-12-19 NOTE — TELEPHONE ENCOUNTER
Patient has a new vitreous hemorrhage in her left eye that is limiting vision.  It is medically urgent for the patient to undergo Avastin left eye today in order to preserve vision.      Kenzie Woodall on 12/19/2023 at 9:00 AM

## 2024-01-03 DIAGNOSIS — E11.3513 PROLIFERATIVE DIABETIC RETINOPATHY OF BOTH EYES WITH MACULAR EDEMA ASSOCIATED WITH TYPE 2 DIABETES MELLITUS (H): Primary | ICD-10-CM

## 2024-01-11 ENCOUNTER — OFFICE VISIT (OUTPATIENT)
Dept: OPHTHALMOLOGY | Facility: CLINIC | Age: 44
End: 2024-01-11
Attending: OPHTHALMOLOGY
Payer: MEDICAID

## 2024-01-11 DIAGNOSIS — E11.3513 PROLIFERATIVE DIABETIC RETINOPATHY OF BOTH EYES WITH MACULAR EDEMA ASSOCIATED WITH TYPE 2 DIABETES MELLITUS (H): ICD-10-CM

## 2024-01-11 PROCEDURE — 92134 CPTRZ OPH DX IMG PST SGM RTA: CPT | Performed by: OPHTHALMOLOGY

## 2024-01-11 PROCEDURE — 99214 OFFICE O/P EST MOD 30 MIN: CPT | Mod: 25 | Performed by: OPHTHALMOLOGY

## 2024-01-11 PROCEDURE — G0463 HOSPITAL OUTPT CLINIC VISIT: HCPCS | Mod: 25 | Performed by: OPHTHALMOLOGY

## 2024-01-11 PROCEDURE — 67028 INJECTION EYE DRUG: CPT | Mod: LT | Performed by: OPHTHALMOLOGY

## 2024-01-11 PROCEDURE — 250N000011 HC RX IP 250 OP 636: Performed by: OPHTHALMOLOGY

## 2024-01-11 RX ADMIN — Medication 1.25 MG: at 16:02

## 2024-01-11 ASSESSMENT — CUP TO DISC RATIO
OS_RATIO: 0.3
OD_RATIO: 0.3

## 2024-01-11 ASSESSMENT — TONOMETRY
OD_IOP_MMHG: 14
IOP_METHOD: ICARE
OS_IOP_MMHG: 13

## 2024-01-11 ASSESSMENT — SLIT LAMP EXAM - LIDS
COMMENTS: NORMAL
COMMENTS: NORMAL

## 2024-01-11 ASSESSMENT — EXTERNAL EXAM - LEFT EYE: OS_EXAM: NORMAL

## 2024-01-11 ASSESSMENT — REFRACTION_WEARINGRX
OS_CYLINDER: +0.25
OD_CYLINDER: SPHERE
OS_AXIS: 090
OD_SPHERE: -2.00
OS_SPHERE: -2.50

## 2024-01-11 ASSESSMENT — VISUAL ACUITY
METHOD: SNELLEN - LINEAR
OD_CC: 2/200E
OS_CC: 20/20

## 2024-01-11 ASSESSMENT — EXTERNAL EXAM - RIGHT EYE: OD_EXAM: NORMAL

## 2024-01-11 NOTE — PROGRESS NOTES
CC: here follow up for proliferative diabetic retinopathy   New vitreous hemorrhage on 12/16/2023    Interval: Vision improvec is stable it has not gotten better or worse right eye, vision in the left eye has improved.      Past ocular history: proliferative diabetic retinopathy  History of Tractional retinal detachment repair with Silicone Oil right eye   Status post 25 g Pars plana vitrectomy (PPV)/ endolaser/ membrane peel/ air fluid exchange/ Silicone Oil 1000 cs for 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 6.14.22  status post 25g Pars plana vitrectomy /silicone oil removal / endolaser / air fluid exchange (Right) 12.13.22      OCT 01/11/2024  Right Eye: Epiretinal membrane, temporal intraretinal fluid with hard exudates, stable.    Left Eye: trace ERM, parafoveal intraretinal fluid. - improved    ASSESSMENT:     # history of DMII  # proliferative diabetic retinopathy both eyes     # PDR left eye  # inf Vitreous Hemorrhage left eye resolving  -S/p PRP 2/17/22 and 6/8/22; 11/09/22   Recurrence of vitreous hemorrhage on 12/16/2023  - Had avastin injection 12/16/2023  - Vision improved  - Will repeat avastin injection 01/11/24     # right eye: history of Tractional retinal detachment   Status post 25 g Pars plana vitrectomy (PPV)/ endolaser/ membrane peel/ air fluid exchange/ Silicone Oil 1000 cs for 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 6.14.22  # right eye status post  25g Pars plana vitrectomy / silicone oil removal / endolaser / air fluid exchange (Right) 12.13.22  Doing well  Retina attached; peripheral laser scars     # Cataract both eyes  visually significant cataract right eye   Needs Cataract extraction intraocular lens when insurance approves it     PLAN:   - Will follow up in 1 month for cataract evaluation and surgery schedule    Retina detachment and endophthalmitis precautions were discussed with the patient and was asked to return if any of the those  occur  Follow up in 8 weeks with Optical Coherence Tomography and fluorescein angiography transits left eye + prescription + PENELOPE right eye   Possible cataract evaluation right eye ; possible avastin injections    Vito Catalan MD  PGY-5 Vitreoretinal Surgery Fellow  [facility]    ~~~~~~~~~~~~~~~~~~~~~~~~~~~~~~~~~~   Complete documentation of historical and exam elements from today's encounter can be found in the full encounter summary report (not reduplicated in this progress note).  I personally obtained the chief complaint(s) and history of present illness.  I confirmed and edited as necessary the review of systems, past medical/surgical history, family history, social history, and examination findings as documented by others; and I examined the patient myself.  I personally reviewed the relevant tests, images, and reports as documented above.  I personally reviewed the ophthalmic test(s) associated with this encounter, agree with the interpretation(s) as documented by the resident/fellow, and have edited the corresponding report(s) as necessary.   I formulated and edited as necessary the assessment and plan and discussed the findings and management plan with the patient and family and No resident or fellow assisted with the procedures performed.  I performed the procedures myself.    Emmy Murphy MD  Professor of Ophthalmology  Vitreo-Retinal surgeon   Department of Ophthalmology and Visual Neurosciences   [facility]  Phone: [phone]   Fax: [phone]

## 2024-01-11 NOTE — NURSING NOTE
Chief Complaints and History of Present Illnesses   Patient presents with    Diabetic Retinopathy Follow Up     Chief Complaint(s) and History of Present Illness(es)       Diabetic Retinopathy Follow Up              Laterality: both eyes    Course: gradually improving    Associated symptoms: floaters.  Negative for eye pain and flashes              Comments    Here for follow up. VA has improved since last visit. Stable floaters without flashes. No eye pain.    Last A1c: ~8% taken 8-9 months ago.    Tonio Vinson COT 2:00 PM January 11, 2024

## 2024-01-25 DIAGNOSIS — E11.3513 PROLIFERATIVE DIABETIC RETINOPATHY OF BOTH EYES WITH MACULAR EDEMA ASSOCIATED WITH TYPE 2 DIABETES MELLITUS (H): Primary | ICD-10-CM

## 2024-02-08 ENCOUNTER — OFFICE VISIT (OUTPATIENT)
Dept: OPHTHALMOLOGY | Facility: CLINIC | Age: 44
End: 2024-02-08
Attending: OPHTHALMOLOGY
Payer: MEDICAID

## 2024-02-08 DIAGNOSIS — H26.9 NUCLEAR CATARACT, NONSENILE: Primary | ICD-10-CM

## 2024-02-08 DIAGNOSIS — E11.3513 PROLIFERATIVE DIABETIC RETINOPATHY OF BOTH EYES WITH MACULAR EDEMA ASSOCIATED WITH TYPE 2 DIABETES MELLITUS (H): ICD-10-CM

## 2024-02-08 PROCEDURE — 92235 FLUORESCEIN ANGRPH MLTIFRAME: CPT | Performed by: OPHTHALMOLOGY

## 2024-02-08 PROCEDURE — 76519 ECHO EXAM OF EYE: CPT | Performed by: OPHTHALMOLOGY

## 2024-02-08 PROCEDURE — 250N000011 HC RX IP 250 OP 636: Performed by: OPHTHALMOLOGY

## 2024-02-08 PROCEDURE — G0463 HOSPITAL OUTPT CLINIC VISIT: HCPCS | Mod: 25 | Performed by: OPHTHALMOLOGY

## 2024-02-08 PROCEDURE — 99214 OFFICE O/P EST MOD 30 MIN: CPT | Mod: 25 | Performed by: OPHTHALMOLOGY

## 2024-02-08 PROCEDURE — 67028 INJECTION EYE DRUG: CPT | Mod: LT | Performed by: OPHTHALMOLOGY

## 2024-02-08 RX ADMIN — Medication 1.25 MG: at 12:17

## 2024-02-08 ASSESSMENT — TONOMETRY
IOP_METHOD: TONOPEN
OD_IOP_MMHG: 15
OS_IOP_MMHG: 15

## 2024-02-08 ASSESSMENT — CUP TO DISC RATIO
OS_RATIO: 0.3
OD_RATIO: 0.3

## 2024-02-08 ASSESSMENT — SLIT LAMP EXAM - LIDS
COMMENTS: NORMAL
COMMENTS: NORMAL

## 2024-02-08 ASSESSMENT — REFRACTION_WEARINGRX
OS_CYLINDER: +0.25
OS_SPHERE: -2.50
OS_AXIS: 090
OD_SPHERE: -2.00
OD_CYLINDER: SPHERE

## 2024-02-08 ASSESSMENT — EXTERNAL EXAM - RIGHT EYE: OD_EXAM: NORMAL

## 2024-02-08 ASSESSMENT — VISUAL ACUITY
OS_BAT_LOW: 20/20
OS_BAT_MED: 20/20-
OS_CC: 20/20
METHOD: SNELLEN - LINEAR
OS_BAT_HIGH: 20/20-
OD_CC: 3' 200E

## 2024-02-08 ASSESSMENT — EXTERNAL EXAM - LEFT EYE: OS_EXAM: NORMAL

## 2024-02-08 NOTE — PROGRESS NOTES
CC: here follow up for proliferative diabetic retinopathy   New vitreous hemorrhage on 12/16/2023 left eye     Interval: Vision improvec is stable it has not gotten better or worse right eye, vision in the left eye has improved.      Past ocular history: proliferative diabetic retinopathy  History of Tractional retinal detachment repair with Silicone Oil right eye   Status post 25 g Pars plana vitrectomy (PPV)/ endolaser/ membrane peel/ air fluid exchange/ Silicone Oil 1000 cs for 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 6.14.22  status post 25g Pars plana vitrectomy /silicone oil removal / endolaser / air fluid exchange (Right) 12.13.22    OCT 01/11/2024  Right Eye: Epiretinal membrane, temporal intraretinal fluid with hard exudates, stable.    Left Eye: trace ERM, parafoveal intraretinal fluid. - improved    Topography 02/08/24 regular astigmatism    Fluorescein angiography 02/08/24   Normal AV and choroidal filling  Right eye with staining of the laser scars. Hazy view because of  cataract   Left eye with mid peripheral leakage and staining of the laser scars.     ASSESSMENT:     # history of DMII  # proliferative diabetic retinopathy both eyes   Status post Panretinal laser photocoagulation (PRP)   # PDR left eye    # inf Vitreous Hemorrhage left eye resolving  -S/p PRP 2/17/22 and 6/8/22; 11/09/22   Recurrence of vitreous hemorrhage on 12/16/2023  - Had avastin injection 12/16/2023  - Vision improved  - avastin injection 01/11/24 ; 02/08/24     # right eye: history of Tractional retinal detachment   Status post 25 g Pars plana vitrectomy (PPV)/ endolaser/ membrane peel/ air fluid exchange/ Silicone Oil 1000 cs for 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 6.14.22  # right eye status post  25g Pars plana vitrectomy / silicone oil removal / endolaser / air fluid exchange (Right) 12.13.22  Doing well  Retina attached; peripheral laser scars     # Cataract both eyes right eye>>  left eye  PENELOPE: NI   visually significant cataract right eye   Needs Cataract extraction intraocular lens when insurance approves it     Plan for Cataract extraction intraocular lens right eye   The cataract is visually significant, Reports impacts ADL and has glare     Surgeon procedure time:  45 min  Urgency of Surgery: Routine  Post-op apps needed: 1day; 1 week; 3 weeks  Multi surgeon case: No   H&P completed by primary care physician/PAC   needed: YES  Anesthesia: general and Peribulbar block. Patient prefers general because of  anxiety     Aim for: -0.5    Dilation:Good  Iris expansion: Not needed  Epinephrine: No  Pseudoexfoliation: NO  Trypan Blue: YES   Phacodonesis: No  Cornea guttae: rare  Anticoagulants: NO  Flomax: NO  Candidate for multifocal or toric IOL: NO  Visually significant astigmatism: Discussed elective surgical refractive corrective options  Prior refractive surgery: No    I reviewed the indications, risks, benefits, and alternatives of the proposed surgical procedure. We discussed at length cataracts and the effect of the cataracts on vision.   We discussed the fact that modern cataract surgery is usually successful at alleviating symptoms of glare when the cataract is the causative factor. Other risks were discussed with patient including, but not limited to, failure to improve vision or further loss of vision, bleeding, infection, loss of vision and the remote possibility of complications of anesthesia or need for additional surgery. 1:1000 risk of infection/bleed/loss of eye; 1:100 risk of RD and need for further surgery.  Patient agreed to proceed with surgery.  I provided multiple opportunities for the questions, answered all questions to the best of my ability, and confirmed that my answers and my discussion were understood.       PLAN:  follow up POD1 Cataract extraction intraocular lens left eye once insurance approves or   - Will follow up in 1 month with Optical Coherence  Tomography + possible avastin injection vs  possible Panretinal laser photocoagulation (PRP) filling left eye     ~~~~~~~~~~~~~~~~~~~~~~~~~~~~~~~~~~   Complete documentation of historical and exam elements from today's encounter can be found in the full encounter summary report (not reduplicated in this progress note).  I personally obtained the chief complaint(s) and history of present illness.  I confirmed and edited as necessary the review of systems, past medical/surgical history, family history, social history, and examination findings as documented by others; and I examined the patient myself.  I personally reviewed the relevant tests, images, and reports as documented above.  I formulated and edited as necessary the assessment and plan and discussed the findings and management plan with the patient and family and No resident or fellow assisted with the procedures performed.  I performed the procedures myself.    Emmy Murphy MD  Professor of Ophthalmology  Vitreo-Retinal surgeon   Department of Ophthalmology and Visual Neurosciences   [facility]  Phone: [phone]   Fax: [phone]

## 2024-02-08 NOTE — NURSING NOTE
Chief Complaints and History of Present Illnesses   Patient presents with    Cataract Evaluation     Chief Complaint(s) and History of Present Illness(es)       Cataract Evaluation              Laterality: both eyes    Associated symptoms: blurred vision (sometimes)    Pain scale: 0/10              Comments    Perlita is here for a cataract evaluation. She feels vision is about the same as last visit.    Charlie Rojas COT 10:03 AM February 8, 2024

## 2024-02-12 ENCOUNTER — TELEPHONE (OUTPATIENT)
Dept: OPHTHALMOLOGY | Facility: CLINIC | Age: 44
End: 2024-02-12
Payer: MEDICAID

## 2024-02-12 NOTE — TELEPHONE ENCOUNTER
Left voicemail via  for patient regarding scheduling surgery with Dr. Murphy.  Provided contact number to discuss.  [phone]    Jesenia Martinez, on 2/12/2024 at 10:04 AM

## 2024-02-15 PROBLEM — H26.9 NUCLEAR CATARACT, NONSENILE: Status: ACTIVE | Noted: 2024-02-08

## 2024-02-15 NOTE — TELEPHONE ENCOUNTER
Patient is scheduled for surgery with: Dr. Murphy    Surgery Date: 3/12     Location: [facility]    H&P: to be completed by Primary Care team - patient instructed to schedule per patient, this will be scheduled with [facility]     Post-op:  3/13, 3/20, 4/11, in-person visit, with Dr Murphy    Patient will receive a phone call from pre-admission nurses 1-2 days prior to surgery with arrival time and NPO instructions.    Patient aware times are subject to change up until day before surgery.     Patient questions/concerns: N/A     Surgery packet was sent via US mail 2/15      Jesenia Martinez on 2/15/2024 at 11:00 AM

## 2024-02-20 DIAGNOSIS — E11.3513 PROLIFERATIVE DIABETIC RETINOPATHY OF BOTH EYES WITH MACULAR EDEMA ASSOCIATED WITH TYPE 2 DIABETES MELLITUS (H): Primary | ICD-10-CM

## 2024-02-26 ENCOUNTER — TRANSFERRED RECORDS (OUTPATIENT)
Dept: HEALTH INFORMATION MANAGEMENT | Facility: CLINIC | Age: 44
End: 2024-02-26
Payer: MEDICAID

## 2024-02-26 LAB
ALT SERPL-CCNC: 14 U/L (ref 6–29)
AST SERPL-CCNC: 13 U/L (ref 10–30)
CHOLESTEROL (EXTERNAL): 147 MG/DL
CREATININE (EXTERNAL): 1.09 MG/DL (ref 0.5–0.99)
GFR ESTIMATED (EXTERNAL): 64 ML/MIN/1.73M2
GLUCOSE (EXTERNAL): 133 MG/DL (ref 65–99)
HBA1C MFR BLD: 7.4 %
HDLC SERPL-MCNC: 40 MG/DL
LDL CHOLESTEROL CALCULATED (EXTERNAL): 63 MG/DL
NON HDL CHOLESTEROL (EXTERNAL): 107 MG/DL
POTASSIUM (EXTERNAL): 4.9 MMOL/L (ref 3.5–5.3)
TRIGLYCERIDES (EXTERNAL): 365 MG/DL

## 2024-03-07 ENCOUNTER — ANESTHESIA EVENT (OUTPATIENT)
Dept: SURGERY | Facility: AMBULATORY SURGERY CENTER | Age: 44
End: 2024-03-07
Payer: MEDICAID

## 2024-03-07 ENCOUNTER — OFFICE VISIT (OUTPATIENT)
Dept: OPHTHALMOLOGY | Facility: CLINIC | Age: 44
End: 2024-03-07
Attending: OPHTHALMOLOGY
Payer: MEDICAID

## 2024-03-07 ENCOUNTER — OFFICE VISIT (OUTPATIENT)
Dept: SURGERY | Facility: CLINIC | Age: 44
End: 2024-03-07
Payer: MEDICAID

## 2024-03-07 VITALS
HEIGHT: 62 IN | DIASTOLIC BLOOD PRESSURE: 85 MMHG | HEART RATE: 93 BPM | SYSTOLIC BLOOD PRESSURE: 168 MMHG | OXYGEN SATURATION: 100 % | WEIGHT: 218 LBS | TEMPERATURE: 97.7 F | BODY MASS INDEX: 40.12 KG/M2

## 2024-03-07 DIAGNOSIS — E11.3513 PROLIFERATIVE DIABETIC RETINOPATHY OF BOTH EYES WITH MACULAR EDEMA ASSOCIATED WITH TYPE 2 DIABETES MELLITUS (H): ICD-10-CM

## 2024-03-07 DIAGNOSIS — Z01.818 PRE-OP EVALUATION: Primary | ICD-10-CM

## 2024-03-07 PROCEDURE — 250N000011 HC RX IP 250 OP 636: Performed by: OPHTHALMOLOGY

## 2024-03-07 PROCEDURE — 99203 OFFICE O/P NEW LOW 30 MIN: CPT | Performed by: PHYSICIAN ASSISTANT

## 2024-03-07 PROCEDURE — 99207 PR DROP WITH A PROCEDURE: CPT | Performed by: OPHTHALMOLOGY

## 2024-03-07 PROCEDURE — 67028 INJECTION EYE DRUG: CPT | Mod: 50 | Performed by: OPHTHALMOLOGY

## 2024-03-07 PROCEDURE — 67028 INJECTION EYE DRUG: CPT | Mod: RT | Performed by: OPHTHALMOLOGY

## 2024-03-07 PROCEDURE — 92134 CPTRZ OPH DX IMG PST SGM RTA: CPT | Performed by: OPHTHALMOLOGY

## 2024-03-07 RX ORDER — LIRAGLUTIDE 6 MG/ML
1.8 INJECTION SUBCUTANEOUS EVERY EVENING
COMMUNITY
Start: 2024-02-26

## 2024-03-07 RX ADMIN — Medication 1.25 MG: at 11:19

## 2024-03-07 RX ADMIN — Medication 1.25 MG: at 11:20

## 2024-03-07 ASSESSMENT — CONF VISUAL FIELD
OS_INFERIOR_NASAL_RESTRICTION: 0
OS_NORMAL: 1
OD_INFERIOR_NASAL_RESTRICTION: 3
OS_SUPERIOR_TEMPORAL_RESTRICTION: 0
OS_SUPERIOR_NASAL_RESTRICTION: 0
OS_INFERIOR_TEMPORAL_RESTRICTION: 0
OD_SUPERIOR_NASAL_RESTRICTION: 3

## 2024-03-07 ASSESSMENT — CUP TO DISC RATIO
OS_RATIO: 0.3
OD_RATIO: 0.3

## 2024-03-07 ASSESSMENT — EXTERNAL EXAM - LEFT EYE: OS_EXAM: NORMAL

## 2024-03-07 ASSESSMENT — PAIN SCALES - GENERAL: PAINLEVEL: NO PAIN (0)

## 2024-03-07 ASSESSMENT — TONOMETRY
IOP_METHOD: TONOPEN
OS_IOP_MMHG: 21
OD_IOP_MMHG: 18

## 2024-03-07 ASSESSMENT — LIFESTYLE VARIABLES: TOBACCO_USE: 0

## 2024-03-07 ASSESSMENT — REFRACTION_WEARINGRX
OS_AXIS: 090
OS_SPHERE: -2.50
OD_SPHERE: -2.00
OS_CYLINDER: +0.25
OD_CYLINDER: SPHERE

## 2024-03-07 ASSESSMENT — VISUAL ACUITY
CORRECTION_TYPE: GLASSES
OS_CC: 20/20
METHOD: SNELLEN - LINEAR
OD_CC: 200E@3'

## 2024-03-07 ASSESSMENT — SLIT LAMP EXAM - LIDS
COMMENTS: NORMAL
COMMENTS: NORMAL

## 2024-03-07 ASSESSMENT — ENCOUNTER SYMPTOMS: SEIZURES: 0

## 2024-03-07 ASSESSMENT — EXTERNAL EXAM - RIGHT EYE: OD_EXAM: NORMAL

## 2024-03-07 NOTE — H&P
Pre-Operative H & P     CC:  Preoperative exam to assess for increased cardiopulmonary risk while undergoing surgery and anesthesia.    Date of Encounter: 3/7/2024  Primary Care Physician:  No Ref-Primary, Physician     Reason for visit:   Encounter Diagnosis   Name Primary?    Pre-op evaluation Yes       HPI  Perlita Day is a 43 year old female who presents for pre-operative H & P in preparation for  Procedure Information       Case: [id number] Date/Time: 03/12/24 1215    Procedure: RIGHT EYE PHACOEMULSIFICATION, CATARACT, WITH INTRAOCULAR LENS IMPLANT, trypan blue (Right: Eye)    Anesthesia type: General with Block    Diagnosis: Nuclear cataract, nonsenile [H26.9]    Pre-op diagnosis: Nuclear cataract, nonsenile [H26.9]    Location: [place] [address] / [facility]    Providers: Emmy Murphy MD            Patient is being evaluated for comorbid conditions of diabetes    Ms. Josiane Day has known proliferative diabetic retinopathy. She is s/p right eye vitrectomy 12/2022. She follows with ophthalmology and is now scheduled for the above procedure.     History is obtained from the patient and chart review. Patient's  was present for this visit.  available via phone.     Hx of abnormal bleeding or anti-platelet use: denies    Menstrual history: No LMP recorded. (Menstrual status: Irregular Periods).     Past Medical History  Past Medical History:   Diagnosis Date    Diabetic retinopathy associated with diabetes mellitus due to underlying condition (H)        Past Surgical History  Past Surgical History:   Procedure Laterality Date    CHOLECYSTECTOMY  2003    HERNIA REPAIR      [place] LAP,DIAGNOSTIC ABDOMEN Right 07/11/2018    Procedure: LAPAROSCOPY, RIGHT SALPINGO OOPHORECTOMY, LYSIS OF ADHESIONS;  Surgeon: Steven Barajas MD;  Location: [facility];  Service: Gynecology    VITRECTOMY PARSPLANA WITH 25 GAUGE SYSTEM  Right 06/14/2022    Procedure: RIGHT EYE VITRECTOMY, PARS PLANA APPROACH, USING 25-GAUGE INSTRUMENTS / membrane peel / endolaser / oil;  Surgeon: Emmy Murphy MD;  Location: [facility] OR    VITRECTOMY PARSPLANA WITH 25 GAUGE SYSTEM Right 12/13/2022    Procedure: RIGHT EYE VITRECTOMY, PARS PLANA APPROACH, USING 25-GAUGE INSTRUMENTS / silicone oil removal / endolaser / air fluid exchange;  Surgeon: Emmy Murphy MD;  Location: [facility] OR       Prior to Admission Medications  Current Outpatient Medications   Medication Sig Dispense Refill    dorzolamide-timolol (COSOPT) 2-0.5 % ophthalmic solution Place 1 drop into the right eye 2 times daily 10 mL 3    metFORMIN (GLUCOPHAGE) 1000 MG tablet [METFORMIN (GLUCOPHAGE) 1000 MG TABLET] Take 1,000 mg by mouth 2 (two) times a day with meals.      VICTOZA PEN 18 MG/3ML soln Inject 1.8 mg Subcutaneous every evening      neomycin-polymixin-dexamethasone (MAXITROL) 0.1 % ophthalmic suspension Place 1 drop into the right eye 4 times daily 5 mL 1    prednisoLONE acetate (PRED FORTE) 1 % ophthalmic suspension Place 1-2 drops into the right eye 3 times daily 10 mL 3       Allergies  No Known Allergies    Social History  Social History     Socioeconomic History    Marital status: Single     Spouse name: Not on file    Number of children: Not on file    Years of education: Not on file    Highest education level: Not on file   Occupational History    Not on file   Tobacco Use    Smoking status: Never     Passive exposure: Never    Smokeless tobacco: Never   Substance and Sexual Activity    Alcohol use: No    Drug use: No    Sexual activity: Not on file   Other Topics Concern    Not on file   Social History Narrative    Not on file     Social Determinants of Health     Financial Resource Strain: Not on file   Food Insecurity: Not on file   Transportation Needs: Not on file   Physical Activity: Not on file   Stress: Not on file   Social Connections: Not on file    Interpersonal Safety: Not on file   Housing Stability: Not on file       Family History  Family History   Problem Relation Age of Onset    Diabetes Mother     Hypertension Mother     Diabetes Father     Diabetes Maternal Grandmother     Hypertension Maternal Grandmother     Hypertension Maternal Aunt     Hypertension Paternal Aunt     Glaucoma No family hx of     Macular Degeneration No family hx of     Anesthesia Reaction No family hx of     Deep Vein Thrombosis (DVT) No family hx of        Review of Systems  The complete review of systems is negative other than noted in the HPI or here.   Anesthesia Evaluation   Pt has had prior anesthetic.     No history of anesthetic complications       ROS/MED HX  ENT/Pulmonary:     (+)     JERRY risk factors, snores loudly,                               (-) tobacco use   Neurologic:  - neg neurologic ROS  (-) no seizures and no CVA   Cardiovascular:     (+)  - -   -  - -                                 Previous cardiac testing   Echo: Date: Results:    Stress Test:  Date: Results:    ECG Reviewed:  Date: 2018 Results:  SR  Cath:  Date: Results:   (-) taking anticoagulants/antiplatelets   METS/Exercise Tolerance: >4 METS Comment: Going for walks, can go an hour. Denies exertional symptoms    Hematologic:  - neg hematologic  ROS  (-) history of blood clots and history of blood transfusion   Musculoskeletal:  - neg musculoskeletal ROS     GI/Hepatic:  - neg GI/hepatic ROS  (-) GERD   Renal/Genitourinary:  - neg Renal ROS     Endo:     (+)  type II DM, Last HgA1c: 7.0, date: 2/2024,   Normal glucose range: 110-120,            (-) chronic steroid usage   Psychiatric/Substance Use:  - neg psychiatric ROS  (-) psychiatric history   Infectious Disease:  - neg infectious disease ROS     Malignancy:  - neg malignancy ROS     Other:            BP (!) 168/85 (BP Location: Right arm, Patient Position: Sitting, Cuff Size: Adult Large)   Pulse 93   Temp 97.7  F (36.5  C) (Oral)   Ht 1.58  "m (5' 2.21\")   Wt 98.9 kg (218 lb)   SpO2 100%   BMI 39.61 kg/m      Physical Exam   Constitutional: Awake, alert, cooperative, no apparent distress, and appears stated age.  Eyes: right eye cataract   HENT: Normocephalic, oral pharynx with moist mucus membranes, good dentition.   Respiratory: Clear to auscultation bilaterally, no crackles or wheezing.  Cardiovascular: Regular rate and rhythm, normal S1 and S2, and no murmur noted.  Carotids no bruits. No edema. Palpable pulses to radial arteries.   GI: Normal bowel sounds, soft, non-distended, non-tender  Genitourinary:  deferred  Skin: Warm and dry.  No rashes at anticipated surgical site.   Musculoskeletal: Full ROM of neck. There is no redness, warmth, or swelling of the exposed joints. Gross motor strength is normal.    Neurologic: Awake, alert, oriented to name, place and time. Cranial nerves II-XII are grossly intact. Gait is normal.   Neuropsychiatric: Calm, cooperative. Normal affect.     Prior Labs/Diagnostic Studies   All labs and imaging personally reviewed     EKG/ stress test - if available please see in ROS above   No results found.       No data to display                  The patient's records and results personally reviewed by this provider.     Outside records reviewed from: Care Everywhere    LAB/DIAGNOSTIC STUDIES TODAY:  BMP    Assessment    Perlita Day is a 43 year old female seen as a PAC referral for risk assessment and optimization for anesthesia.    Plan/Recommendations  Pt will be optimized for the proposed procedure.  See below for details on the assessment, risk, and preoperative recommendations    NEUROLOGY  - No history of TIA, CVA or seizure  -Post Op delirium risk factors:  No risk identified    ENT  - No current airway concerns.  Will need to be reassessed day of surgery.  Mallampati: II  TM: > 3    CARDIAC  - No history of CAD, Hypertension, and Afib  -denies cardiac symptoms   - METS (Metabolic " "Equivalents)  Patient performs 4 or more METS exercise without symptoms            Total Score: 0      RCRI-Very low risk: Class 1 0.4% complication rate            Total Score: 0        PULMONARY  JERRY Low Risk            Total Score: 1    JERRY: BMI over 35 kg/m2      - Denies asthma or inhaler use  - Tobacco History    History   Smoking Status    Never   Smokeless Tobacco    Never       GI  PONV High Risk  Total Score: 3           1 AN PONV: Pt is Female    1 AN PONV: Patient is not a current smoker    1 AN PONV: Intended Post Op Opioids        /RENAL  - Baseline Creatinine WNL    ENDOCRINE    - BMI: Estimated body mass index is 39.61 kg/m  as calculated from the following:    Height as of this encounter: 1.58 m (5' 2.21\").    Weight as of this encounter: 98.9 kg (218 lb).  Class 3 Obesity (BMI > 40)  - Diabetes, reports she follows with [facility] in [place] and had an appointment last month. Reports her A1c was 7.0.  Will ask for lab results from [facility]. Hold Victoza x2 days, metformin DOS.     HEME  VTE Low Risk 0.26%            Total Score: 0      - No history of abnormal bleeding or antiplatelet use.    Different anesthesia methods/types have been discussed with the patient, but they are aware that the final plan will be decided by the assigned anesthesia provider on the date of service.    The patient is optimized for their procedure. AVS with information on surgery time/arrival time, meds and NPO status given by nursing staff. No further diagnostic testing indicated.      On the day of service:     Prep time: 11 minutes  Visit time: 13 minutes  Documentation time: 10 minutes  ------------------------------------------  Total time: 34 minutes      Nara Moyer PA-C  Preoperative Assessment Center  [facility]  Clinic and Surgery Center  Phone: [phone]  Fax: [phone]    "

## 2024-03-07 NOTE — PROGRESS NOTES
CC: here follow up for proliferative diabetic retinopathy follow up    New vitreous hemorrhage on 12/16/2023 left eye     Interval: Vision is stable it has not gotten better or worse right eye, vision in the left eye has improved.    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    Left Eye: trace ERM, parafoveal intraretinal fluid. - stable    Topography 02/08/24 regular astigmatism    Fluorescein angiography 02/08/24   Normal AV and choroidal filling  Right eye with staining of the laser scars. Hazy view because of  cataract   Left eye with mid peripheral leakage and staining of the laser scars.     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 6.14.22  -status post  25g Pars plana vitrectomy / silicone oil removal / endolaser / air fluid exchange (Right) 12.13.22  Retina attached; peripheral laser scars  - Diabetic macular edema   Recommend avastin inj 03/07/24     # left eye    - PDR left eye  -S/p PRP 2/17/22 and 6/8/22; 11/09/22   Recurrence of vitreous hemorrhage on 12/16/2023  - Had avastin injection 12/16/2023  - Vision improving  - avastin injection 01/11/24 ; 02/08/24 ; 03/07/24      #  Cataract both eyes right eye>> left eye  PENELOPE: NI   visually significant cataract right eye   Needs Cataract extraction intraocular lens when insurance approves it     Plan for Cataract extraction intraocular lens right eye   The cataract is visually significant, Reports impacts ADL and has glare     Surgeon procedure time:  45 min  Urgency of Surgery: Routine  Post-op apps needed: 1day; 1 week; 3 weeks  Multi surgeon case: No   H&P completed by primary care physician/PAC   needed: YES  Anesthesia: general and Peribulbar block. Patient prefers general because of  anxiety     Aim for: -0.5    Dilation:Good  Iris expansion: Not needed  Epinephrine: No  Pseudoexfoliation: NO  Trypan Blue: YES   Phacodonesis: No  Cornea guttae: rare  Anticoagulants: NO  Flomax: NO  Candidate for multifocal or toric IOL: NO  Visually significant astigmatism: Discussed elective surgical refractive corrective options  Prior refractive surgery: No    Will request a 22.50 [id number] aiming for 0.59        I reviewed the indications, risks, benefits, and alternatives of the proposed surgical procedure. We discussed at length cataracts and the effect of the cataracts on vision.   We discussed the fact that modern cataract surgery is usually successful at alleviating symptoms of glare when the cataract is the causative factor. Other risks were discussed with patient including, but not limited to, failure to improve vision or further loss of vision, bleeding, infection, loss of vision and the remote possibility of complications of anesthesia or need for additional surgery. 1:1000 risk of infection/bleed/loss of eye; 1:100 risk of RD and need for further surgery.  Patient agreed to proceed with surgery.  I provided multiple opportunities for the questions, answered all questions to the best of my ability, and confirmed that my answers and my discussion were understood.       PLAN: avastin both eyes 03/07/24   - follow up POD1 Cataract  extraction intraocular lens left eye   - Will need Panretinal laser photocoagulation (PRP) filling left eye in the future    ~~~~~~~~~~~~~~~~~~~~~~~~~~~~~~~~~~   Complete documentation of historical and exam elements from today's encounter can be found in the full encounter summary report (not reduplicated in this progress note).  I personally obtained the chief complaint(s) and history of present illness.  I confirmed and edited as necessary the review of systems, past medical/surgical history, family history, social history, and examination findings as documented by others; and I examined the patient myself.  I personally reviewed the relevant tests, images, and reports as documented above.  I formulated and edited as necessary the assessment and plan and discussed the findings and management plan with the patient and family and No resident or fellow assisted with the procedures performed.  I performed the procedures myself.    Emmy Murphy MD  Professor of Ophthalmology  Vitreo-Retinal surgeon   Department of Ophthalmology and Visual Neurosciences   [facility]  Phone: [phone]   Fax: [phone]

## 2024-03-07 NOTE — NURSING NOTE
"Chief Complaints and History of Present Illnesses   Patient presents with    Diabetic Retinopathy Follow Up     Proliferative diabetic retinopathy      Chief Complaint(s) and History of Present Illness(es)       Diabetic Retinopathy Follow Up              Associated symptoms: floaters.  Negative for glare, haloes, eye pain and flashes    Treatments tried: artificial tears    Pain scale: 0/10    Comments: Proliferative diabetic retinopathy               Comments    Pt states vision is the same.  No pain.  No flashes.  No change to floaters.  No glare/haloes.  AT's PRN.    DM 2  Pt did not check BS today.  No results found for: \"A1C\"  Per Pt last A1C was 7 about 2 weeks ago.    RORO Najera March 7, 2024 9:49 AM                       "

## 2024-03-07 NOTE — PATIENT INSTRUCTIONS
Preparing for Your Surgery      Name:  Perlita Day   MRN:  [id number]   :  1980   Today's Date:  3/7/2024         Arriving for surgery:  Surgery date:  2024  Arrival time:  11:00 am    Restrictions due to COVID 19:    Please maintain social distance.  Masking is optional.      parking is available for anyone with mobility limitations or disabilities. (Monday- Friday 7 am- 5 pm)    Please come to:    [facility]  [address] [place] [phone]    Please check in on the 5th floor at the Ambulatory Surgery Center.      What can I eat or drink?    -  You may eat and drink normally until 8 hours prior to arrival  time. (Until 3 am)  -  You may have clear liquids until 2 hours prior to arrival  time. (Until 9 am)    Examples of clear liquids:  Water  Clear broth  Juices (apple, white grape, white cranberry  and cider) without pulp  Noncarbonated, powder based beverages  (lemonade and Get-Aid)  Sodas (Sprite, 7-Up, ginger ale and seltzer)  Coffee or tea (without milk or cream)  Gatorade      Which medicines can I take?    Hold Aspirin for 7 days before surgery.   Hold Multivitamins for 7 days before surgery.  Hold Supplements for 7 days before surgery.  Hold Ibuprofen (Advil, Motrin) for 1 day before surgery--unless otherwise directed by surgeon.  Hold Naproxen (Aleve) for 4 days before surgery.    Hold Victoza the evening before surgery     No alcohol or cannabis products for 24 hours prior to procedure.      -  DO NOT take the following medications the day of surgery:  Metformin       -  PLEASE TAKE the following medications the day of surgery:   Eye drops per your routine       How do I prepare myself?  - Please take 2 showers (one the night prior to surgery and one the morning of surgery) using Scrubcare or Hibiclens soap.    Use this soap only from the neck to your toes.     Leave the soap on your skin for one minute--then rinse thoroughly.       You may use your own shampoo and conditioner. No other hair products.   - Please remove all jewelry and body piercings.  - No lotions, deodorants or fragrance.  - No makeup or fingernail polish.   - Bring your ID and insurance card.    -If you have a Deep Brain Stimulator, a Spinal Cord Stimulator, or any implanted Neuro Device, you must bring the remote to the Surgery Center.         ALL PATIENTS ARE REQUIRED TO HAVE A RESPONSIBLE ADULT TO DRIVE AND BE IN ATTENDANCE WITH THEM FOR 24 HOURS FOLLOWING SURGERY.     Covid testing policy as of 12/06/2022  Your surgeon will notify and schedule you for a COVID test if one is needed before surgery--please direct any questions or COVID symptoms to your surgeon      Questions or Concerns:    -For questions regarding the day of surgery, please contact the Ambulatory Surgery Center at [phone].    -If you have health changes between today and your surgery, please contact your surgeon.     - For questions after surgery, please contact your surgeon's office.

## 2024-03-11 ASSESSMENT — LIFESTYLE VARIABLES: TOBACCO_USE: 0

## 2024-03-11 ASSESSMENT — ENCOUNTER SYMPTOMS: SEIZURES: 0

## 2024-03-11 NOTE — ANESTHESIA PREPROCEDURE EVALUATION
Anesthesia Pre-Procedure Evaluation    Patient: Perlita Day   MRN: [id number] : 1980        Procedure : Procedure(s):  RIGHT EYE PHACOEMULSIFICATION, CATARACT, WITH INTRAOCULAR LENS IMPLANT, trypan blue          Past Medical History:   Diagnosis Date    Diabetic retinopathy associated with diabetes mellitus due to underlying condition (H)       Past Surgical History:   Procedure Laterality Date    CHOLECYSTECTOMY  2003    HERNIA REPAIR      [place] LAP,DIAGNOSTIC ABDOMEN Right 2018    Procedure: LAPAROSCOPY, RIGHT SALPINGO OOPHORECTOMY, LYSIS OF ADHESIONS;  Surgeon: Steven Barajas MD;  Location: [facility];  Service: Gynecology    VITRECTOMY PARSPLANA WITH 25 GAUGE SYSTEM Right 2022    Procedure: RIGHT EYE VITRECTOMY, PARS PLANA APPROACH, USING 25-GAUGE INSTRUMENTS / membrane peel / endolaser / oil;  Surgeon: Emmy Murphy MD;  Location: [facility] OR    VITRECTOMY PARSPLANA WITH 25 GAUGE SYSTEM Right 2022    Procedure: RIGHT EYE VITRECTOMY, PARS PLANA APPROACH, USING 25-GAUGE INSTRUMENTS / silicone oil removal / endolaser / air fluid exchange;  Surgeon: Emmy Murphy MD;  Location: [facility] OR      No Known Allergies   Social History     Tobacco Use    Smoking status: Never     Passive exposure: Never    Smokeless tobacco: Never   Substance Use Topics    Alcohol use: No      Wt Readings from Last 1 Encounters:   24 98.9 kg (218 lb)        Anesthesia Evaluation   Pt has had prior anesthetic.     No history of anesthetic complications       ROS/MED HX  ENT/Pulmonary:     (+) JERRY risk factors, snores loudly,  (-) tobacco use   Neurologic:  - neg neurologic ROS  (-) no seizures and no CVA   Cardiovascular:     (+) -----Previous cardiac testing   Echo: Date: Results:    Stress Test: Date: Results:    ECG Reviewed: Date:  Results:  SR  Cath: Date: Results:   (-) taking anticoagulants/antiplatelets   METS/Exercise Tolerance: >4 METS Comment: Going for  "walks, can go an hour. Denies exertional symptoms    Hematologic:  - neg hematologic  ROS  (-) history of blood clots and history of blood transfusion   Musculoskeletal:  - neg musculoskeletal ROS     GI/Hepatic:  - neg GI/hepatic ROS  (-) GERD   Renal/Genitourinary:  - neg Renal ROS     Endo:     (+) type II DM, Last HgA1c: 7.0, date: 2/2024, Normal glucose range: 110-120,  (-) chronic steroid usage   Psychiatric/Substance Use:  - neg psychiatric ROS  (-) psychiatric history   Infectious Disease:  - neg infectious disease ROS     Malignancy:  - neg malignancy ROS     Other:            Physical Exam    Airway        Mallampati: II   TM distance: > 3 FB   Neck ROM: full   Mouth opening: > 3 cm    Respiratory Devices and Support         Dental       (+) Minor Abnormalities - some fillings, tiny chips      Cardiovascular   cardiovascular exam normal          Pulmonary   pulmonary exam normal                OUTSIDE LABS:  CBC:   Lab Results   Component Value Date    WBC 8.6 06/05/2018    HGB 12.4 07/11/2018    HGB 11.6 (L) 06/05/2018    HCT 35.1 06/05/2018     06/05/2018     BMP:   Lab Results   Component Value Date     06/05/2018    POTASSIUM 4.1 06/05/2018    CHLORIDE 102 06/05/2018    CO2 24 06/05/2018    BUN 14 06/05/2018    CR 0.73 06/05/2018     (H) 12/13/2022     (H) 12/13/2022     COAGS: No results found for: \"PTT\", \"INR\", \"FIBR\"  POC:   Lab Results   Component Value Date    HCG Negative 12/13/2022     HEPATIC: No results found for: \"ALBUMIN\", \"PROTTOTAL\", \"ALT\", \"AST\", \"GGT\", \"ALKPHOS\", \"BILITOTAL\", \"BILIDIRECT\", \"NICHOLAS\"  OTHER:   Lab Results   Component Value Date    BRITTNEE 9.2 06/05/2018    CRP 0.5 06/05/2018       Anesthesia Plan    ASA Status:  2    NPO Status:  NPO Appropriate    Anesthesia Type: MAC.     - Reason for MAC: immobility needed   Induction: Intravenous, Propofol.           Consents    Anesthesia Plan(s) and associated risks, benefits, and realistic alternatives " "discussed. Questions answered and patient/representative(s) expressed understanding.     - Discussed:     - Discussed with:  Patient,       - Extended Intubation/Ventilatory Support Discussed: No.      - Patient is DNR/DNI Status: No     Use of blood products discussed: No .     Postoperative Care    Pain management: Multi-modal analgesia.   PONV prophylaxis: Ondansetron (or other 5HT-3)     Comments:               Pavel Goldberg MD    I have reviewed the pertinent notes and labs in the chart from the past 30 days and (re)examined the patient.  Any updates or changes from those notes are reflected in this note.              # Obesity: Estimated body mass index is 39.61 kg/m  as calculated from the following:    Height as of 3/7/24: 1.58 m (5' 2.21\").    Weight as of 3/7/24: 98.9 kg (218 lb).    "

## 2024-03-12 ENCOUNTER — HOSPITAL ENCOUNTER (OUTPATIENT)
Facility: AMBULATORY SURGERY CENTER | Age: 44
Discharge: HOME | End: 2024-03-12
Attending: OPHTHALMOLOGY
Payer: MEDICAID

## 2024-03-12 ENCOUNTER — ANESTHESIA (OUTPATIENT)
Dept: SURGERY | Facility: AMBULATORY SURGERY CENTER | Age: 44
End: 2024-03-12
Payer: MEDICAID

## 2024-03-12 VITALS
DIASTOLIC BLOOD PRESSURE: 92 MMHG | SYSTOLIC BLOOD PRESSURE: 171 MMHG | OXYGEN SATURATION: 98 % | RESPIRATION RATE: 14 BRPM | HEIGHT: 62 IN | BODY MASS INDEX: 40.12 KG/M2 | HEART RATE: 94 BPM | TEMPERATURE: 97.3 F | WEIGHT: 218 LBS

## 2024-03-12 DIAGNOSIS — H26.9 NUCLEAR CATARACT, NONSENILE: Primary | ICD-10-CM

## 2024-03-12 LAB
GLUCOSE BLDC GLUCOMTR-MCNC: 117 MG/DL (ref 70–99)
HCG UR QL: NEGATIVE
INTERNAL QC OK POCT: NORMAL
POCT KIT EXPIRATION DATE: NORMAL
POCT KIT LOT NUMBER: NORMAL

## 2024-03-12 PROCEDURE — 66984 XCAPSL CTRC RMVL W/O ECP: CPT | Performed by: STUDENT IN AN ORGANIZED HEALTH CARE EDUCATION/TRAINING PROGRAM

## 2024-03-12 PROCEDURE — 81025 URINE PREGNANCY TEST: CPT | Performed by: PATHOLOGY

## 2024-03-12 PROCEDURE — 66984 XCAPSL CTRC RMVL W/O ECP: CPT | Mod: RT | Performed by: OPHTHALMOLOGY

## 2024-03-12 PROCEDURE — 82962 GLUCOSE BLOOD TEST: CPT | Performed by: PATHOLOGY

## 2024-03-12 PROCEDURE — 66984 XCAPSL CTRC RMVL W/O ECP: CPT | Performed by: NURSE ANESTHETIST, CERTIFIED REGISTERED

## 2024-03-12 PROCEDURE — 66984 XCAPSL CTRC RMVL W/O ECP: CPT | Mod: RT

## 2024-03-12 DEVICE — LENS CC60WF 22.5 CLAREON UV ASPHERIC BICONVEX IOL: Type: IMPLANTABLE DEVICE | Site: EYE | Status: FUNCTIONAL

## 2024-03-12 RX ORDER — ACETAMINOPHEN 325 MG/1
975 TABLET ORAL ONCE
Status: COMPLETED | OUTPATIENT
Start: 2024-03-12 | End: 2024-03-12

## 2024-03-12 RX ORDER — DEXAMETHASONE SODIUM PHOSPHATE 4 MG/ML
INJECTION, SOLUTION INTRA-ARTICULAR; INTRALESIONAL; INTRAMUSCULAR; INTRAVENOUS; SOFT TISSUE PRN
Status: DISCONTINUED | OUTPATIENT
Start: 2024-03-12 | End: 2024-03-12 | Stop reason: HOSPADM

## 2024-03-12 RX ORDER — BALANCED SALT SOLUTION 6.4; .75; .48; .3; 3.9; 1.7 MG/ML; MG/ML; MG/ML; MG/ML; MG/ML; MG/ML
SOLUTION OPHTHALMIC PRN
Status: DISCONTINUED | OUTPATIENT
Start: 2024-03-12 | End: 2024-03-12 | Stop reason: HOSPADM

## 2024-03-12 RX ORDER — TETRACAINE HYDROCHLORIDE 5 MG/ML
SOLUTION OPHTHALMIC PRN
Status: DISCONTINUED | OUTPATIENT
Start: 2024-03-12 | End: 2024-03-12 | Stop reason: HOSPADM

## 2024-03-12 RX ORDER — PROPARACAINE HYDROCHLORIDE 5 MG/ML
1 SOLUTION/ DROPS OPHTHALMIC ONCE
Status: COMPLETED | OUTPATIENT
Start: 2024-03-12 | End: 2024-03-12

## 2024-03-12 RX ORDER — FENTANYL CITRATE 50 UG/ML
50 INJECTION, SOLUTION INTRAMUSCULAR; INTRAVENOUS EVERY 5 MIN PRN
Status: DISCONTINUED | OUTPATIENT
Start: 2024-03-12 | End: 2024-03-13 | Stop reason: HOSPADM

## 2024-03-12 RX ORDER — KETOROLAC TROMETHAMINE 5 MG/ML
1 SOLUTION OPHTHALMIC 4 TIMES DAILY
Qty: 5 ML | Refills: 0 | Status: SHIPPED | OUTPATIENT
Start: 2024-03-12

## 2024-03-12 RX ORDER — PREDNISOLONE ACETATE 10 MG/ML
1 SUSPENSION/ DROPS OPHTHALMIC 4 TIMES DAILY
Qty: 10 ML | Refills: 0 | Status: SHIPPED | OUTPATIENT
Start: 2024-03-12 | End: 2024-03-20

## 2024-03-12 RX ORDER — HYDROMORPHONE HYDROCHLORIDE 1 MG/ML
0.4 INJECTION, SOLUTION INTRAMUSCULAR; INTRAVENOUS; SUBCUTANEOUS EVERY 5 MIN PRN
Status: DISCONTINUED | OUTPATIENT
Start: 2024-03-12 | End: 2024-03-13 | Stop reason: HOSPADM

## 2024-03-12 RX ORDER — LIDOCAINE HYDROCHLORIDE 20 MG/ML
INJECTION, SOLUTION INFILTRATION; PERINEURAL PRN
Status: DISCONTINUED | OUTPATIENT
Start: 2024-03-12 | End: 2024-03-12

## 2024-03-12 RX ORDER — CYCLOPENTOLAT/TROPIC/PHENYLEPH 1%-1%-2.5%
1 DROPS (EA) OPHTHALMIC (EYE)
Status: COMPLETED | OUTPATIENT
Start: 2024-03-12 | End: 2024-03-12

## 2024-03-12 RX ORDER — NALOXONE HYDROCHLORIDE 0.4 MG/ML
0.1 INJECTION, SOLUTION INTRAMUSCULAR; INTRAVENOUS; SUBCUTANEOUS
Status: DISCONTINUED | OUTPATIENT
Start: 2024-03-12 | End: 2024-03-13 | Stop reason: HOSPADM

## 2024-03-12 RX ORDER — ONDANSETRON 4 MG/1
4 TABLET, ORALLY DISINTEGRATING ORAL EVERY 30 MIN PRN
Status: DISCONTINUED | OUTPATIENT
Start: 2024-03-12 | End: 2024-03-13 | Stop reason: HOSPADM

## 2024-03-12 RX ORDER — LIDOCAINE 40 MG/G
CREAM TOPICAL
Status: DISCONTINUED | OUTPATIENT
Start: 2024-03-12 | End: 2024-03-13 | Stop reason: HOSPADM

## 2024-03-12 RX ORDER — OXYCODONE HYDROCHLORIDE 5 MG/1
10 TABLET ORAL
Status: DISCONTINUED | OUTPATIENT
Start: 2024-03-12 | End: 2024-03-13 | Stop reason: HOSPADM

## 2024-03-12 RX ORDER — OXYCODONE HYDROCHLORIDE 5 MG/1
5 TABLET ORAL
Status: DISCONTINUED | OUTPATIENT
Start: 2024-03-12 | End: 2024-03-13 | Stop reason: HOSPADM

## 2024-03-12 RX ORDER — MOXIFLOXACIN 5 MG/ML
1 SOLUTION/ DROPS OPHTHALMIC 3 TIMES DAILY
Qty: 3 ML | Refills: 0 | Status: SHIPPED | OUTPATIENT
Start: 2024-03-12 | End: 2024-04-25

## 2024-03-12 RX ORDER — SODIUM CHLORIDE, SODIUM LACTATE, POTASSIUM CHLORIDE, CALCIUM CHLORIDE 600; 310; 30; 20 MG/100ML; MG/100ML; MG/100ML; MG/100ML
INJECTION, SOLUTION INTRAVENOUS CONTINUOUS
Status: DISCONTINUED | OUTPATIENT
Start: 2024-03-12 | End: 2024-03-13 | Stop reason: HOSPADM

## 2024-03-12 RX ORDER — HYDROMORPHONE HYDROCHLORIDE 1 MG/ML
0.2 INJECTION, SOLUTION INTRAMUSCULAR; INTRAVENOUS; SUBCUTANEOUS EVERY 5 MIN PRN
Status: DISCONTINUED | OUTPATIENT
Start: 2024-03-12 | End: 2024-03-13 | Stop reason: HOSPADM

## 2024-03-12 RX ORDER — SODIUM CHLORIDE, SODIUM LACTATE, POTASSIUM CHLORIDE, CALCIUM CHLORIDE 600; 310; 30; 20 MG/100ML; MG/100ML; MG/100ML; MG/100ML
INJECTION, SOLUTION INTRAVENOUS CONTINUOUS
Status: CANCELLED | OUTPATIENT
Start: 2024-03-12

## 2024-03-12 RX ORDER — PROPOFOL 10 MG/ML
INJECTION, EMULSION INTRAVENOUS PRN
Status: DISCONTINUED | OUTPATIENT
Start: 2024-03-12 | End: 2024-03-12

## 2024-03-12 RX ORDER — FENTANYL CITRATE 50 UG/ML
25 INJECTION, SOLUTION INTRAMUSCULAR; INTRAVENOUS EVERY 5 MIN PRN
Status: DISCONTINUED | OUTPATIENT
Start: 2024-03-12 | End: 2024-03-13 | Stop reason: HOSPADM

## 2024-03-12 RX ORDER — DICLOFENAC SODIUM 1 MG/ML
1 SOLUTION/ DROPS OPHTHALMIC
Status: COMPLETED | OUTPATIENT
Start: 2024-03-12 | End: 2024-03-12

## 2024-03-12 RX ORDER — MOXIFLOXACIN 5 MG/ML
1 SOLUTION/ DROPS OPHTHALMIC
Status: COMPLETED | OUTPATIENT
Start: 2024-03-12 | End: 2024-03-12

## 2024-03-12 RX ORDER — ONDANSETRON 2 MG/ML
4 INJECTION INTRAMUSCULAR; INTRAVENOUS EVERY 30 MIN PRN
Status: DISCONTINUED | OUTPATIENT
Start: 2024-03-12 | End: 2024-03-13 | Stop reason: HOSPADM

## 2024-03-12 RX ORDER — PROPARACAINE HYDROCHLORIDE 5 MG/ML
1 SOLUTION/ DROPS OPHTHALMIC ONCE
Status: DISCONTINUED | OUTPATIENT
Start: 2024-03-12 | End: 2024-03-13 | Stop reason: HOSPADM

## 2024-03-12 RX ADMIN — PROPARACAINE HYDROCHLORIDE 1 DROP: 5 SOLUTION/ DROPS OPHTHALMIC at 11:11

## 2024-03-12 RX ADMIN — SODIUM CHLORIDE, SODIUM LACTATE, POTASSIUM CHLORIDE, CALCIUM CHLORIDE: 600; 310; 30; 20 INJECTION, SOLUTION INTRAVENOUS at 12:15

## 2024-03-12 RX ADMIN — PROPOFOL 100 MG: 10 INJECTION, EMULSION INTRAVENOUS at 12:18

## 2024-03-12 RX ADMIN — DICLOFENAC SODIUM 1 DROP: 1 SOLUTION/ DROPS OPHTHALMIC at 11:12

## 2024-03-12 RX ADMIN — Medication 1 DROP: at 11:16

## 2024-03-12 RX ADMIN — MOXIFLOXACIN 1 DROP: 5 SOLUTION/ DROPS OPHTHALMIC at 11:16

## 2024-03-12 RX ADMIN — LIDOCAINE HYDROCHLORIDE 40 MG: 20 INJECTION, SOLUTION INFILTRATION; PERINEURAL at 12:18

## 2024-03-12 RX ADMIN — DICLOFENAC SODIUM 1 DROP: 1 SOLUTION/ DROPS OPHTHALMIC at 11:16

## 2024-03-12 RX ADMIN — Medication 1 DROP: at 11:13

## 2024-03-12 RX ADMIN — MOXIFLOXACIN 1 DROP: 5 SOLUTION/ DROPS OPHTHALMIC at 11:20

## 2024-03-12 RX ADMIN — ACETAMINOPHEN 975 MG: 325 TABLET ORAL at 11:13

## 2024-03-12 RX ADMIN — MOXIFLOXACIN 1 DROP: 5 SOLUTION/ DROPS OPHTHALMIC at 11:12

## 2024-03-12 RX ADMIN — DICLOFENAC SODIUM 1 DROP: 1 SOLUTION/ DROPS OPHTHALMIC at 11:21

## 2024-03-12 RX ADMIN — Medication 1 DROP: at 11:22

## 2024-03-12 NOTE — ANESTHESIA POSTPROCEDURE EVALUATION
Patient: Perlita Day    Procedure: Procedure(s):  RIGHT EYE COMPLEX PHACOEMULSIFICATION, CATARACT, WITH INTRAOCULAR LENS IMPLANT, trypan blue       Anesthesia Type:  MAC    Note:  Disposition: Outpatient   Postop Pain Control: Uneventful            Sign Out: Well controlled pain   PONV: No   Neuro/Psych: Uneventful            Sign Out: Acceptable/Baseline neuro status   Airway/Respiratory: Uneventful            Sign Out: Acceptable/Baseline resp. status   CV/Hemodynamics: Uneventful            Sign Out: Acceptable CV status; No obvious hypovolemia; No obvious fluid overload   Other NRE:    DID A NON-ROUTINE EVENT OCCUR?            Last vitals:  Vitals Value Taken Time   /96 03/12/24 1255   Temp 36.3  C (97.3  F) 03/12/24 1255   Pulse 95 03/12/24 1255   Resp 14 03/12/24 1255   SpO2 98 % 03/12/24 1255       Electronically Signed By: Pavel Goldberg MD  March 12, 2024  1:06 PM

## 2024-03-12 NOTE — ANESTHESIA CARE TRANSFER NOTE
Patient: Perlita Day    Procedure: Procedure(s):  RIGHT EYE COMPLEX PHACOEMULSIFICATION, CATARACT, WITH INTRAOCULAR LENS IMPLANT, trypan blue       Diagnosis: Nuclear cataract, nonsenile [H26.9]  Diagnosis Additional Information: No value filed.    Anesthesia Type:   General     Note:    Oropharynx: oropharynx clear of all foreign objects and spontaneously breathing  Level of Consciousness: awake  Oxygen Supplementation: room air    Independent Airway: airway patency satisfactory and stable  Dentition: dentition unchanged  Vital Signs Stable: post-procedure vital signs reviewed and stable  Report to RN Given: handoff report given  Patient transferred to: Phase II    Handoff Report: Identifed the Patient, Identified the Reponsible Provider, Reviewed the pertinent medical history, Discussed the surgical course, Reviewed Intra-OP anesthesia mangement and issues during anesthesia, Set expectations for post-procedure period and Allowed opportunity for questions and acknowledgement of understanding    See post op vitals  Vitals:  Vitals Value Taken Time   BP     Temp     Pulse     Resp     SpO2         Electronically Signed By: AVELINO Mcintosh CRNA  March 12, 2024  12:56 PM

## 2024-03-12 NOTE — DISCHARGE INSTRUCTIONS
[facility]  Home Care Following Anesthesia  For 24 hours after surgery:  Get plenty of rest.  A responsible adult must stay with you for at least 24 hours after you leave the surgery center.  Do not drive or use heavy equipment.  If you have weakness or tingling, don't drive or use heavy equipment until this feeling goes away.   Do not drink alcohol.   Avoid strenuous or risky activities.  Ask for help when climbing stairs.  You may feel lightheaded.  IF so, sit for a few minutes before standing.  Have someone help you get up.   If you have nausea (feel sick to your stomach): Drink only clear liquids such as apple juice, ginger ale, broth or 7-Up.  Rest may also help.  Be sure to drink enough fluids.  Move to a regular diet as you feel able.   You may have a slight fever.  Call the doctor if your fever is over 100 F (37.7 C) (taken under the tongue) or lasts longer than 24 hours.  You may have a dry mouth, a sore throat, muscle aches or trouble sleeping. These should go away after 24 hours.  Do not make important or legal decisions.   It is recommended to avoid smoking.               Tips for taking pain medications  To get the best pain relief possible, remember these points:  Take pain medications as directed, before pain becomes severe.  Pain medication can upset your stomach: taking it with food may help.  Constipation is a common side effect of pain medication. Drink plenty of  fluids.  Eat foods high in fiber. Take a stool softener if recommended by your doctor or pharmacist.  Do not drink alcohol, drive or operate machinery while taking pain medications.  Ask about other ways to control pain, such as with heat, ice or relaxation.    Tylenol/Acetaminophen Consumption    If you feel your pain relief is insufficient, you may take Tylenol/Acetaminophen in addition to your narcotic pain medication.   Be careful not to exceed 4,000 mg of Tylenol/Acetaminophen in a 24 hour  period from all sources.  If you are taking extra strength Tylenol/acetaminophen (500 mg), the maximum dose is 8 tablets in 24 hours.  If you are taking regular strength acetaminophen (325 mg), the maximum dose is 12 tablets in 24 hours.    Call a doctor for any of the following:  Signs of infection (fever, growing tenderness at the surgery site, a large amount of drainage or bleeding, severe pain, foul-smelling drainage, redness, swelling).  It has been over 8 to 10 hours since surgery and you are still not able to urinate (pass water).  Headache for over 24 hours.  Numbness, tingling or weakness the day after surgery (if you had spinal anesthesia).  Signs of Covid-19 infection (temperature over 100 degrees, shortness of breath, cough, loss of taste/smell, generalized body aches, persistent headache, chills, sore throat, nausea/vomiting/diarrhea)  Your doctor is:       Dr. Emmy Murphy, Ophthalmology: [phone]               Or dial [phone] and ask for the resident on call for:  Ophthalmology  For emergency care, call the:  [facility]:  [phone] (TTY for hearing impaired: [phone])

## 2024-03-12 NOTE — OP NOTE
SURGEON: BERNARDO BRITTON MD  Assistant surgeon: Vito Catalan MD  PREOPERATIVE DIAGNOSIS:   1. visually significant dense nucleosclerotic cataract right eye   POSTOPERATIVE DIAGNOSIS: same  NAME OF THE PROCEDURE: Complex phacoemulsification with intraocular lens implantation right eye   Dense cataract and need for tripan blue  ANESTHESIA: Monitored anesthesia care and peribulbar block   COMPLICATIONS: none  INDICATIONS: Perlita Day is a 43 year old with diagnosis of visually significant cataract, here for cataract surgery    DESCRIPTION OF THE PROCEDURE:  The patient was taken to the operative room where intravenous sedation was administered and a perbulbar block consisting of a 1:1 mixture of 2%lidocaine and 0.75% marcaine with epinephrine and wydase, was administered to the operative eye with adequate anesthesia and akinesia.    The operative eye was prepped and draped in the usual sterile surgical fashion for ophthalmic surgery, including the installation of one drop of 5% Povidone Iodine.  A sterile drape was placed over the face and body and a lid speculum was inserted.      With the use of a Supersharp blade and 0.12 forceps, a paracentesis was created at the 2 o'clock position, and air was injected into the Anterior chamber. Next, tripan blue was used stain the anterior capsule. Tripan blue was instilled into the anterior chamber to stain the capsule in the setting of a dense lens with dense anterior and posterior subcapsular cataract  then washed out after 30 seconds. Next  epi-Shugarcaine was injected into the eye,   Next, viscoelastic was injected into the anterior chamber using a canula. A 2.5 mm keratome was then used to construct a clear corneal incision at the 10 o'clock position.  Using Utrata forceps and cystotome needle, a continuous curvilinear capsulorrhexis was created and hydrodissection was undertaken with the use of BSS. The nucleus was found to be freely mobile and then  removed by phacoemulsification using a divide and conquer technique.  The remaining elements of cortex were then removed with irrigation/aspiration.  An IOL,was injected into the capsular bag and was rotated into a good position with a Sinskey hook. The remaining elements of viscoelastic were then removed with irrigation/aspiration. The lid speculum was removed.  Subconjunctival injection of Dexamethasone and Ancef were administered.  The eye was cleaned with wet and dry gauze. Maxitrol ointment was placed on the eye.  A patch and Jones shield were placed over the eye.     Implant Name Type Inv. Item Serial No.  Lot No. LRB No. Used Action   LENS CC60WF 22.5 [employer] UV ASPHERIC BICONVEX IOL - [id number] Lens/Eye Implant LENS CC60WF 22.5 CL[employer] UV ASPHERIC BICONVEX IOL [id number] [employer]  Right 1 Implanted     The surgery was assisted by Dr. Vito Catalan. Due to the delicate and complex nature of this surgery, an assistant was required and No qualified resident was available. He assisted with Cataract extraction. I was present for the entire surgery.

## 2024-03-12 NOTE — BRIEF OP NOTE
[facility] [place]    Brief Operative Note    Pre-operative diagnosis: Nuclear cataract, nonsenile [H26.9]  Post-operative diagnosis Same as pre-operative diagnosis    Procedure: RIGHT EYE COMPLEX PHACOEMULSIFICATION, CATARACT, WITH INTRAOCULAR LENS IMPLANT, trypan blue, Right - Eye    Surgeon: Surgeon(s) and Role:     * Emmy Murphy MD - Primary     * Vito Long MD - Fellow - Assisting  Anesthesia: General with Block   Estimated Blood Loss: None    Drains: None  Specimens: * No specimens in log *  Findings:   None.  Complications: None.  Implants:   Implant Name Type Inv. Item Serial No.  Lot No. LRB No. Used Action   LENS CC60WF 22.5 CL[employer] UV ASPHERIC BICONVEX IOL - [id number] Lens/Eye Implant LENS CC60WF 22.5 [employer] UV ASPHERIC BICONVEX IOL [id number] [employer]  Right 1 Implanted

## 2024-03-13 ENCOUNTER — OFFICE VISIT (OUTPATIENT)
Dept: OPHTHALMOLOGY | Facility: CLINIC | Age: 44
End: 2024-03-13
Attending: OPHTHALMOLOGY
Payer: MEDICAID

## 2024-03-13 DIAGNOSIS — Z48.810 AFTERCARE FOLLOWING SURGERY OF A SENSORY ORGAN: Primary | ICD-10-CM

## 2024-03-13 PROCEDURE — G0463 HOSPITAL OUTPT CLINIC VISIT: HCPCS | Performed by: OPHTHALMOLOGY

## 2024-03-13 PROCEDURE — 99024 POSTOP FOLLOW-UP VISIT: CPT | Mod: GC | Performed by: OPHTHALMOLOGY

## 2024-03-13 ASSESSMENT — EXTERNAL EXAM - RIGHT EYE: OD_EXAM: NORMAL

## 2024-03-13 ASSESSMENT — REFRACTION_WEARINGRX
OS_AXIS: 090
OS_SPHERE: -2.50
OS_CYLINDER: +0.25
OD_SPHERE: -2.00
OD_CYLINDER: SPHERE

## 2024-03-13 ASSESSMENT — TONOMETRY
OD_IOP_MMHG: 18
IOP_METHOD: TONOPEN
OS_IOP_MMHG: 13

## 2024-03-13 ASSESSMENT — SLIT LAMP EXAM - LIDS
COMMENTS: NORMAL
COMMENTS: NORMAL

## 2024-03-13 ASSESSMENT — VISUAL ACUITY
OS_CC: 20/20
OD_SC: 20/400
METHOD: SNELLEN - LINEAR

## 2024-03-13 ASSESSMENT — EXTERNAL EXAM - LEFT EYE: OS_EXAM: NORMAL

## 2024-03-13 ASSESSMENT — CUP TO DISC RATIO: OD_RATIO: 0.3

## 2024-03-13 NOTE — PROGRESS NOTES
"CC: POD1 S/p ccomplex cataract extraction with intraocular lens implantation 03/12/2024    Interval: Patient states that vision in her right eye has improved. 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    Left Eye: trace ERM, parafoveal intraretinal fluid. - stable    Topography 02/08/24 regular astigmatism    Fluorescein angiography 02/08/24   Normal AV and choroidal filling  Right eye with staining of the laser scars. Hazy view because of  cataract   Left eye with mid peripheral leakage and staining of the laser scars.     ASSESSMENT:   #S/p ccomplex cataract extraction with intraocular lens implantation   VA: 20/400. Intraocular pressure 18.  22.5 CC60WF Posterior chamber intraocular lens (PCIOL) in good position with significant posterior capsular opacity (PCO)  Will need yag in the future    Ketorolac (gray top) four times a day    Predforte  (pink top) four times a day  (shake the bottle before)  Ofloxacin (tan top) four times a day    Put the eyedrops 5 minutes a part  Eye shield or glasses at all times x 3 weeks  Sleep with the shield  No heavy lifting   Follow-up in one week  Retina detachment and endophthalmitis precautions were discussed with the patient (increased blurry vision, drainage, new flashes, floaters or a curtain in the visual field) and was asked to return if any of the those occur    What to watch out for:  If you experience any of the following \"RSVP Symptoms\", you should call " immediately:  Worsening Redness  Worsening Sensitivity to light  Worsening Vision, including new flashing lights or floaters  Worsening Pain, including nausea/vomiting    # history of DMII  # proliferative diabetic retinopathy both eyes   Status post Panretinal laser photocoagulation (PRP)     # right eye:  - history of Tractional retinal detachment   -Status post 25 g Pars plana vitrectomy (PPV)/ endolaser/ membrane peel/ air fluid exchange/ Silicone Oil 1000 cs for 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 6.14.22  -status post  25g Pars plana vitrectomy / silicone oil removal / endolaser / air fluid exchange (Right) 12.13.22  Retina attached; peripheral laser scars  - Diabetic macular edema   Recommend avastin inj 03/07/24     # left eye    - PDR left eye  -S/p PRP 2/17/22 and 6/8/22; 11/09/22   Recurrence of vitreous hemorrhage on 12/16/2023  - Had avastin injection 12/16/2023  - Vision improving  - avastin injection 01/11/24 ; 02/08/24 ; 03/07/24      # Cataract  left eye  Not affecting vision  Monitor      PLAN: Follow up POW1   Will need YAG capsulotomy in the future    Vito Catalan MD  PGY-5 Vitreoretinal Surgery Fellow  [facility]    ~~~~~~~~~~~~~~~~~~~~~~~~~~~~~~~~~~   Complete documentation of historical and exam elements from today's encounter can be found in the full encounter summary report (not reduplicated in this progress note).  I personally obtained the chief complaint(s) and history of present illness.  I confirmed and edited as necessary the review of systems, past medical/surgical history, family history, social history, and examination findings as documented by others; and I examined the patient myself.  I personally reviewed the relevant tests, images, and reports as documented above.  I formulated and edited as necessary the assessment and plan and discussed the findings and management plan with the patient and family    Emmy Murphy MD  Professor of  Ophthalmology  Vitreo-Retinal surgeon   Department of Ophthalmology and Visual Neurosciences   [facility]  Phone: [phone]   Fax: [phone]

## 2024-03-20 ENCOUNTER — OFFICE VISIT (OUTPATIENT)
Dept: OPHTHALMOLOGY | Facility: CLINIC | Age: 44
End: 2024-03-20
Attending: OPHTHALMOLOGY
Payer: MEDICAID

## 2024-03-20 DIAGNOSIS — H26.9 NUCLEAR CATARACT, NONSENILE: ICD-10-CM

## 2024-03-20 PROCEDURE — 99024 POSTOP FOLLOW-UP VISIT: CPT | Performed by: OPHTHALMOLOGY

## 2024-03-20 PROCEDURE — G0463 HOSPITAL OUTPT CLINIC VISIT: HCPCS | Performed by: OPHTHALMOLOGY

## 2024-03-20 RX ORDER — PREDNISOLONE ACETATE 10 MG/ML
1 SUSPENSION/ DROPS OPHTHALMIC 2 TIMES DAILY
Qty: 10 ML | Refills: 2 | Status: SHIPPED | OUTPATIENT
Start: 2024-03-20

## 2024-03-20 ASSESSMENT — SLIT LAMP EXAM - LIDS
COMMENTS: NORMAL
COMMENTS: NORMAL

## 2024-03-20 ASSESSMENT — TONOMETRY
OD_IOP_MMHG: 20
IOP_METHOD: ICARE
OS_IOP_MMHG: 16

## 2024-03-20 ASSESSMENT — EXTERNAL EXAM - LEFT EYE: OS_EXAM: NORMAL

## 2024-03-20 ASSESSMENT — VISUAL ACUITY
OS_CC+: -1
OS_CC: 20/20
OD_SC: 20/300
METHOD: SNELLEN - LINEAR

## 2024-03-20 ASSESSMENT — EXTERNAL EXAM - RIGHT EYE: OD_EXAM: NORMAL

## 2024-03-20 ASSESSMENT — CUP TO DISC RATIO: OD_RATIO: 0.3

## 2024-03-20 NOTE — PROGRESS NOTES
CC: POW1 S/p complex cataract extraction with intraocular lens implantation 03/12/2024    Interval: Patient states that vision in her right eye has improved. 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    Left Eye: trace ERM, parafoveal intraretinal fluid. - stable    Topography 02/08/24 regular astigmatism    Fluorescein angiography 02/08/24   Normal AV and choroidal filling  Right eye with staining of the laser scars. Hazy view because of  cataract   Left eye with mid peripheral leakage and staining of the laser scars.     ASSESSMENT:     #S/p ccomplex cataract extraction with intraocular lens implantation   VA: 20/400. Intraocular pressure 18.  22.5 CC60WF Posterior chamber intraocular lens (PCIOL) in good position with significant posterior capsular opacity (PCO)  Will need yag in the future    Ketorolac (gray top) Three times a day x 1 week, then twice a day x 1 week and then once a day till finish  Predforte  (pink top) Three times a day x 1 week, then twice a day x 1 week and then once a day till finish  (shake the bottle before)  Ofloxacin (tan top) ok to stop   Put the eyedrops 5 minutes a part  Eye shield or glasses at all times x 3 weeks  Sleep with the shield    Retina detachment and endophthalmitis precautions were discussed with the patient (increased blurry vision, drainage, new flashes, floaters or a curtain in the visual field) and was asked to return if any of the those occur    What  "to watch out for:  If you experience any of the following \"RSVP Symptoms\", you should call immediately:  Worsening Redness  Worsening Sensitivity to light  Worsening Vision, including new flashing lights or floaters  Worsening Pain, including nausea/vomiting    # history of DMII  # proliferative diabetic retinopathy both eyes   Status post Panretinal laser photocoagulation (PRP)     # right eye: proliferative diabetic retinopathy with   - history of Tractional retinal detachment   -Status post 25 g Pars plana vitrectomy (PPV)/ endolaser/ membrane peel/ air fluid exchange/ Silicone Oil 1000 cs for 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 6.14.22  -status post  25g Pars plana vitrectomy / silicone oil removal / endolaser / air fluid exchange (Right) 12.13.22  Retina attached; peripheral laser scars  - Diabetic macular edema   Recommend avastin inj 03/07/24     # left eye  proliferative diabetic retinopathy   -S/p PRP 2/17/22 and 6/8/22; 11/09/22   Recurrence of vitreous hemorrhage on 12/16/2023  - Had avastin injection 12/16/2023  - Vision improving  - avastin injection 01/11/24 ; 02/08/24 ; 03/07/24      # Cataract  left eye  Not affecting vision  Monitor      PLAN: Follow up POW3 with prescription; Optical Coherence Tomography and dilation; possible avastin inj   Repeat fluorescein angiography around June/July  Will need YAG capsulotomy in the future  ~~~~~~~~~~~~~~~~~~~~~~~~~~~~~~~~~~   Complete documentation of historical and exam elements from today's encounter can be found in the full encounter summary report (not reduplicated in this progress note).  I personally obtained the chief complaint(s) and history of present illness.  I confirmed and edited as necessary the review of systems, past medical/surgical history, family history, social history, and examination findings as documented by others; and I examined the patient myself.  I personally reviewed the relevant tests, images, and reports " as documented above.  I formulated and edited as necessary the assessment and plan and discussed the findings and management plan with the patient and family    Emmy Murphy MD  Professor of Ophthalmology  Vitreo-Retinal surgeon   Department of Ophthalmology and Visual Neurosciences   [facility]  Phone: [phone]   Fax: [phone]

## 2024-03-20 NOTE — NURSING NOTE
Chief Complaints and History of Present Illnesses   Patient presents with    Post Op (Ophthalmology) Right Eye     POW1 s/p complex cataract extraction with intraocular lens implantation 03/12/2024     Chief Complaint(s) and History of Present Illness(es)       Post Op (Ophthalmology) Right Eye              Associated symptoms: Negative for eye pain, flashes and floaters    Treatments tried: eye drops    Pain scale: 0/10    Comments: POW1 s/p complex cataract extraction with intraocular lens implantation 03/12/2024              Comments    Pt states no eye pain or discomfort.   She tells me vision is still slightly blurry.   Pt states compliant with post op eyedrop schedule.   No new concerns.     Anselmo Peña 8:54 AM March 20, 2024

## 2024-03-20 NOTE — PATIENT INSTRUCTIONS
"Ketorolac (gray top) Three times a day x 1 week, then twice a day x 1 week and then once a day till finish  Predforte  (pink top) Three times a day x 1 week, then twice a day x 1 week and then once a day till finish  (shake the bottle before)  Ofloxacin (tan top) ok to stop   Put the eyedrops 5 minutes a part  Eye shield or glasses at all times x 3 weeks  Sleep with the shield     Retina detachment and endophthalmitis precautions were discussed with the patient (increased blurry vision, drainage, new flashes, floaters or a curtain in the visual field) and was asked to return if any of the those occur     What to watch out for:  If you experience any of the following \"RSVP Symptoms\", you should call immediately:  Worsening Redness  Worsening Sensitivity to light  Worsening Vision, including new flashing lights or floaters  Worsening Pain, including nausea/vomiting     "

## 2024-04-02 DIAGNOSIS — E11.3513 PROLIFERATIVE DIABETIC RETINOPATHY OF BOTH EYES WITH MACULAR EDEMA ASSOCIATED WITH TYPE 2 DIABETES MELLITUS (H): Primary | ICD-10-CM

## 2024-04-11 ENCOUNTER — OFFICE VISIT (OUTPATIENT)
Dept: OPHTHALMOLOGY | Facility: CLINIC | Age: 44
End: 2024-04-11
Attending: OPHTHALMOLOGY

## 2024-04-11 DIAGNOSIS — E11.3513 PROLIFERATIVE DIABETIC RETINOPATHY OF BOTH EYES WITH MACULAR EDEMA ASSOCIATED WITH TYPE 2 DIABETES MELLITUS (H): ICD-10-CM

## 2024-04-11 PROCEDURE — 67028 INJECTION EYE DRUG: CPT | Mod: LT | Performed by: OPHTHALMOLOGY

## 2024-04-11 PROCEDURE — 99024 POSTOP FOLLOW-UP VISIT: CPT | Mod: GC | Performed by: OPHTHALMOLOGY

## 2024-04-11 PROCEDURE — 67028 INJECTION EYE DRUG: CPT | Mod: RT | Performed by: OPHTHALMOLOGY

## 2024-04-11 PROCEDURE — 92134 CPTRZ OPH DX IMG PST SGM RTA: CPT | Performed by: OPHTHALMOLOGY

## 2024-04-11 ASSESSMENT — VISUAL ACUITY
CORRECTION_TYPE: GLASSES
OS_CC: 20/20
OD_SC: 20/200
METHOD: SNELLEN - LINEAR

## 2024-04-11 ASSESSMENT — CUP TO DISC RATIO
OS_RATIO: 0.3
OD_RATIO: 0.3

## 2024-04-11 ASSESSMENT — TONOMETRY
OS_IOP_MMHG: 16
IOP_METHOD: TONOPEN
OD_IOP_MMHG: 17

## 2024-04-11 ASSESSMENT — REFRACTION_MANIFEST
OD_SPHERE: -0.75
OD_CYLINDER: +1.00
OD_ADD: +2.50
OD_AXIS: 070

## 2024-04-11 ASSESSMENT — REFRACTION_WEARINGRX
OD_SPHERE: -2.00
OS_SPHERE: -2.50
OD_CYLINDER: SPHERE
OS_CYLINDER: +0.25
OS_AXIS: 090

## 2024-04-11 ASSESSMENT — SLIT LAMP EXAM - LIDS
COMMENTS: NORMAL
COMMENTS: NORMAL

## 2024-04-11 ASSESSMENT — EXTERNAL EXAM - LEFT EYE: OS_EXAM: NORMAL

## 2024-04-11 ASSESSMENT — EXTERNAL EXAM - RIGHT EYE: OD_EXAM: NORMAL

## 2024-04-11 NOTE — PROGRESS NOTES
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    Left Eye: trace ERM, parafoveal intraretinal fluid. - stable    Topography 02/08/24 regular astigmatism    Fluorescein angiography 02/08/24   Normal AV and choroidal filling  Right eye with staining of the laser scars. Hazy view because of  cataract   Left eye with mid peripheral leakage and staining of the laser scars.     ASSESSMENT:     #S/p complex cataract extraction with intraocular lens implantation OD  VA: 20/250. Intraocular pressure 16.  22.5 CC60WF Posterior chamber intraocular lens (PCIOL) in good position with significant posterior capsular opacity (PCO)  Will need yag in the future    Ketorolac (gray top) once a day till finish  Predforte  (pink top) once a day till finish  Put the eyedrops 5 minutes a part    # history of DMII  # proliferative diabetic retinopathy both eyes   Status post Panretinal laser photocoagulation (PRP)     # right eye: proliferative diabetic retinopathy with   - history of Tractional retinal detachment   -Status post 25 g Pars plana vitrectomy (PPV)/ endolaser/ membrane peel/ air fluid exchange/ Silicone Oil 1000 cs for rhegmatogenous and Tractional retinal detachment  right eye 6.14.22  -status post  25g Pars plana vitrectomy / silicone oil removal / endolaser / air fluid exchange (Right) 12.13.22  Retina attached; peripheral laser scars  - Diabetic macular edema   -s/p avastin 3/7/24  Recommend avastin inj 04/11/24 - unable to perform 2/2 insurance issues  Will T&E to 7 weeks as no worsening of fluid at 5 weeks    # left eye  proliferative diabetic retinopathy   -S/p PRP 2/17/22 and 6/8/22; 11/09/22   Recurrence of vitreous hemorrhage on 12/16/2023  - Had avastin injection 12/16/2023  - Vision improving  - avastin injection 01/11/24 ; 02/08/24 ; 03/07/24   Recommend avastin inj 04/11/24 - unable to perform 2/2 insurance issues  Will T&E to 7 weeks as no worsening of fluid at 5 weeks     # Cataract  left eye  Not affecting vision  Monitor      PLAN: Phone message left today with  (Diana [phone]) with [facility] in order to determine if pt will be reimbursed if she makes the cost-of-care estimates payment of $1,679.85 for bilateral injections in clinic. Pt scheduled for 2 week follow-up for planned bilateral injection if able to proceed  Will T&E  Follow up 8 weeks for with Optical Coherence Tomography and dilation; yag right eye    Then follow up a week after yag; dilation both eyes and fluorescein angiography transits left eye; possible avastin inj vs laser    Abhilash Perez MD MPH  Vitreoretinal Fellow PGY-6  [facility]     ~~~~~~~~~~~~~~~~~~~~~~~~~~~~~~~~~~   Complete documentation of historical and exam elements from today's encounter can be found in the full encounter summary report (not reduplicated in this progress note).  I personally obtained the chief complaint(s) and history of present illness.  I confirmed and edited as necessary the review of systems, past medical/surgical history, family history, social history, and examination findings as documented by others; and I examined the patient myself.  I personally reviewed the relevant  tests, images, and reports as documented above.  I formulated and edited as necessary the assessment and plan and discussed the findings and management plan with the patient and family and No resident or fellow assisted with the procedures performed.  I performed the procedures myself.    Emmy Murphy MD  Professor of Ophthalmology  Vitreo-Retinal surgeon   Department of Ophthalmology and Visual Neurosciences   [facility]  Phone: [phone]   Fax: [phone]

## 2024-04-11 NOTE — NURSING NOTE
Chief Complaints and History of Present Illnesses   Patient presents with    Post Op (Ophthalmology) Right Eye     Chief Complaint(s) and History of Present Illness(es)       Post Op (Ophthalmology) Right Eye              Laterality: right eye    Onset: gradual    Onset: weeks ago    Quality: Feels the va is better      Severity: moderate    Frequency: intermittently    Pain scale: 0/10              Comments    S/P complex cataract extraction with intraocular lens implantation 03/12/2024  Pink every day right eye   Barker every day right eye   BS  125 avg  A1C  7.0   2/2024  Joyce Alvares COT 8:09 AM April 11, 2024

## 2024-04-25 ENCOUNTER — OFFICE VISIT (OUTPATIENT)
Dept: OPHTHALMOLOGY | Facility: CLINIC | Age: 44
End: 2024-04-25
Attending: OPHTHALMOLOGY

## 2024-04-25 DIAGNOSIS — E11.3513 PROLIFERATIVE DIABETIC RETINOPATHY OF BOTH EYES WITH MACULAR EDEMA ASSOCIATED WITH TYPE 2 DIABETES MELLITUS (H): Primary | ICD-10-CM

## 2024-04-25 PROCEDURE — 99207 PR DROP WITH A PROCEDURE: CPT | Performed by: OPHTHALMOLOGY

## 2024-04-25 PROCEDURE — 67028 INJECTION EYE DRUG: CPT | Mod: RT | Performed by: OPHTHALMOLOGY

## 2024-04-25 PROCEDURE — 67028 INJECTION EYE DRUG: CPT | Mod: 50 | Performed by: OPHTHALMOLOGY

## 2024-04-25 PROCEDURE — 250N000011 HC RX IP 250 OP 636: Performed by: OPHTHALMOLOGY

## 2024-04-25 PROCEDURE — 999N000103 HC STATISTIC NO CHARGE FACILITY FEE

## 2024-04-25 RX ADMIN — Medication 1.25 MG: at 16:28

## 2024-04-25 ASSESSMENT — VISUAL ACUITY
OD_SC: 20/200
OS_CC+: -1
OD_PH_SC: 20/150
METHOD: SNELLEN - LINEAR
OS_CC: 20/20

## 2024-04-25 ASSESSMENT — CUP TO DISC RATIO
OS_RATIO: 0.3
OD_RATIO: 0.3

## 2024-04-25 ASSESSMENT — EXTERNAL EXAM - RIGHT EYE: OD_EXAM: NORMAL

## 2024-04-25 ASSESSMENT — TONOMETRY
OS_IOP_MMHG: 17
IOP_METHOD: TONOPEN
OD_IOP_MMHG: 18

## 2024-04-25 ASSESSMENT — REFRACTION_WEARINGRX
OS_SPHERE: -2.50
OD_SPHERE: -2.00
OD_CYLINDER: SPHERE
OS_AXIS: 090
OS_CYLINDER: +0.25

## 2024-04-25 ASSESSMENT — SLIT LAMP EXAM - LIDS
COMMENTS: NORMAL
COMMENTS: NORMAL

## 2024-04-25 ASSESSMENT — EXTERNAL EXAM - LEFT EYE: OS_EXAM: NORMAL

## 2024-04-25 NOTE — NURSING NOTE
"Chief Complaints and History of Present Illnesses   Patient presents with    Diabetic Retinopathy Follow Up     2 week follow up      Chief Complaint(s) and History of Present Illness(es)       Diabetic Retinopathy Follow Up              Comments: 2 week follow up               Comments    Pt states vision is the same as last visit. No eye pain today. No new flashes or floaters.  No redness. Dryness in both eyes, relief with drops.  DM2 BS:116 checked 2 days ago per pt.  No results found for: \"A1C\"    KAYLIN Villalobos April 25, 2024 2:53 PM                         "
(3) no apparent problem

## 2024-04-25 NOTE — PROGRESS NOTES
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    Left Eye: trace ERM, parafoveal intraretinal fluid. - stable    Topography 02/08/24 regular astigmatism    Fluorescein angiography 02/08/24   Normal AV and choroidal filling  Right eye with staining of the laser scars. Hazy view because of  cataract   Left eye with mid peripheral leakage and staining of the laser scars.     ASSESSMENT:     #S/p complex cataract extraction with intraocular lens implantation OD  VA: 20/250. Intraocular pressure 16.  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 6.14.22  -status post  25g Pars plana vitrectomy / silicone oil  removal / endolaser / air fluid exchange (Right) 12.13.22  Retina attached; peripheral laser scars  - Diabetic macular edema   -s/p avastin 3/7/24  Recommend avastin inj 04/11/24 - unable to perform 2/2 insurance issues  Will T&E to 7 weeks as no worsening of fluid at 5 weeks    # left eye  proliferative diabetic retinopathy   -S/p PRP 2/17/22 and 6/8/22; 11/09/22   Recurrence of vitreous hemorrhage on 12/16/2023  - Had avastin injection 12/16/2023  - Vision improving  - avastin injection 01/11/24 ; 02/08/24 ; 03/07/24   Recommend avastin inj 04/11/24 - unable to perform 2/2 insurance issues  Will T&E to 7 weeks as no worsening of fluid at 5 weeks     # Cataract  left eye  Not affecting vision  Monitor      PLAN: Phone message left today with  (Diana [phone]) with [facility] in order to determine if pt will be reimbursed if she makes the cost-of-care estimates payment of $1,677.85 for bilateral injections in clinic.     [facility] [place] approved- Avastin inj both eyes 04/25/24     Follow up in 8 weeks for yag laser right eye  Then follow up a week after yag; dilation both eyes; Optical Coherence Tomography and fluorescein angiography transits left eye; possible avastin inj vs laser  ~~~~~~~~~~~~~~~~~~~~~~~~~~~~~~~~~~   Complete documentation of historical and exam elements from today's encounter can be found in the full encounter summary report (not reduplicated in this progress note).  I personally obtained the chief complaint(s) and history of present illness.  I confirmed and edited as necessary the review of systems, past medical/surgical history, family history, social history, and examination findings as documented by others; and I examined the patient myself.  I personally reviewed the relevant tests, images, and reports as documented above.  I formulated and edited as necessary the assessment and plan and discussed the findings and management plan with the patient and family and No resident  or fellow assisted with the procedures performed.  I performed the procedures myself.    Emmy Murphy MD  Professor of Ophthalmology  Vitreo-Retinal surgeon   Department of Ophthalmology and Visual Neurosciences   [facility]  Phone: [phone]   Fax: [phone]

## 2024-06-20 ENCOUNTER — OFFICE VISIT (OUTPATIENT)
Dept: OPHTHALMOLOGY | Facility: CLINIC | Age: 44
End: 2024-06-20
Attending: OPHTHALMOLOGY

## 2024-06-20 DIAGNOSIS — H26.491 PCO (POSTERIOR CAPSULAR OPACIFICATION), RIGHT: Primary | ICD-10-CM

## 2024-06-20 PROCEDURE — 99207 PR DROP WITH A PROCEDURE: CPT | Performed by: OPHTHALMOLOGY

## 2024-06-20 PROCEDURE — 66821 AFTER CATARACT LASER SURGERY: CPT | Mod: RT | Performed by: OPHTHALMOLOGY

## 2024-06-20 ASSESSMENT — REFRACTION_WEARINGRX
OD_SPHERE: -2.00
OS_SPHERE: -2.50
OD_CYLINDER: SPHERE
OS_CYLINDER: +0.25
OS_AXIS: 090

## 2024-06-20 ASSESSMENT — VISUAL ACUITY
METHOD: SNELLEN - LINEAR
OD_CC: 20/250
OS_CC: 20/20
OD_PH_CC: 20/150
CORRECTION_TYPE: GLASSES

## 2024-06-20 ASSESSMENT — SLIT LAMP EXAM - LIDS
COMMENTS: NORMAL
COMMENTS: NORMAL

## 2024-06-20 ASSESSMENT — EXTERNAL EXAM - RIGHT EYE: OD_EXAM: NORMAL

## 2024-06-20 ASSESSMENT — TONOMETRY
OS_IOP_MMHG: 19
OD_IOP_MMHG: 18
IOP_METHOD: TONOPEN

## 2024-06-20 ASSESSMENT — CUP TO DISC RATIO
OD_RATIO: 0.3
OS_RATIO: 0.3

## 2024-06-20 ASSESSMENT — EXTERNAL EXAM - LEFT EYE: OS_EXAM: NORMAL

## 2024-06-20 NOTE — PROGRESS NOTES
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    Left Eye: trace ERM, parafoveal intraretinal fluid. - stable    Topography 02/08/24 regular astigmatism    Fluorescein angiography 02/08/24   Normal AV and choroidal filling  Right eye with staining of the laser scars. Hazy view because of  cataract   Left eye with mid peripheral leakage and staining of the laser scars.     ASSESSMENT:     #S/p complex cataract extraction with intraocular lens implantation OD  VA: 20/250. Intraocular pressure 16.  22.5 CC60WF Posterior chamber intraocular lens (PCIOL) in good position with significant posterior capsular opacity (PCO)  06/20/24 yag right eye   Risks, benefits and alternatives discussed with patient and agreed to proceed   No complications     Stop eyedrops    # history of DMII  # proliferative diabetic retinopathy both eyes   Status post Panretinal laser photocoagulation (PRP)     # right eye: proliferative diabetic retinopathy with   - history of Tractional retinal detachment   -Status post 25 g Pars plana vitrectomy (PPV)/ endolaser/ membrane peel/ air fluid exchange/ Silicone Oil 1000 cs for rhegmatogenous  and Tractional retinal detachment right eye 6.14.22  -status post  25g Pars plana vitrectomy / silicone oil removal / endolaser / air fluid exchange (Right) 12.13.22  Retina attached; peripheral laser scars  - Diabetic macular edema   -s/p avastin 3/7/24  Recommend avastin inj 04/11/24 - unable to perform 2/2 insurance issues  Will T&E to 7 weeks as no worsening of fluid at 5 weeks    # left eye  proliferative diabetic retinopathy   -S/p PRP 2/17/22 and 6/8/22; 11/09/22   Recurrence of vitreous hemorrhage on 12/16/2023  - Had avastin injection 12/16/2023  - Vision improving  - avastin injection 01/11/24 ; 02/08/24 ; 03/07/24   Recommend avastin inj 04/11/24 - unable to perform 2/2 insurance issues  Will T&E to 7 weeks as no worsening of fluid at 5 weeks     # Cataract  left eye  Not affecting vision  Monitor      PLAN: yag laser right eye   [facility] approved- Avastin inj both eyes 04/25/24   follow up a week after yag; dilation both eyes; Optical Coherence Tomography and fluorescein angiography transits left eye; possible avastin inj vs laser  ~~~~~~~~~~~~~~~~~~~~~~~~~~~~~~~~~~   Complete documentation of historical and exam elements from today's encounter can be found in the full encounter summary report (not reduplicated in this progress note).  I personally obtained the chief complaint(s) and history of present illness.  I confirmed and edited as necessary the review of systems, past medical/surgical history, family history, social history, and examination findings as documented by others; and I examined the patient myself.  I personally reviewed the relevant tests, images, and reports as documented above.  I formulated and edited as necessary the assessment and plan and discussed the findings and management plan with the patient and family and No resident or fellow assisted with the procedures performed.  I performed the procedures myself.    Emmy Murphy MD  Professor of Ophthalmology  Vitreo-Retinal  surgeon   Department of Ophthalmology and Visual Neurosciences   [facility]  Phone: [phone]   Fax: [phone]

## 2024-06-20 NOTE — NURSING NOTE
Chief Complaints and History of Present Illnesses   Patient presents with    Diabetic Retinopathy Follow Up     Chief Complaint(s) and History of Present Illness(es)       Diabetic Retinopathy Follow Up              Laterality: both eyes    Onset: 8 weeks ago              Comments    Pt. States that she is doing well. No change in VA BE. No pain BE. No flashes or floaters BE.   Seen with phone Interpretor ID [id number]  Ashley Caleb READ 3:09 PM June 20, 2024

## 2024-06-27 ENCOUNTER — OFFICE VISIT (OUTPATIENT)
Dept: OPHTHALMOLOGY | Facility: CLINIC | Age: 44
End: 2024-06-27
Attending: OPHTHALMOLOGY

## 2024-06-27 DIAGNOSIS — E11.3513 PROLIFERATIVE DIABETIC RETINOPATHY OF BOTH EYES WITH MACULAR EDEMA ASSOCIATED WITH TYPE 2 DIABETES MELLITUS (H): Primary | ICD-10-CM

## 2024-06-27 PROCEDURE — 92235 FLUORESCEIN ANGRPH MLTIFRAME: CPT | Performed by: OPHTHALMOLOGY

## 2024-06-27 PROCEDURE — 92134 CPTRZ OPH DX IMG PST SGM RTA: CPT | Performed by: OPHTHALMOLOGY

## 2024-06-27 PROCEDURE — 92012 INTRM OPH EXAM EST PATIENT: CPT | Mod: 24 | Performed by: OPHTHALMOLOGY

## 2024-06-27 PROCEDURE — 99213 OFFICE O/P EST LOW 20 MIN: CPT | Mod: 25 | Performed by: OPHTHALMOLOGY

## 2024-06-27 PROCEDURE — 67228 TREATMENT X10SV RETINOPATHY: CPT | Mod: LT | Performed by: OPHTHALMOLOGY

## 2024-06-27 ASSESSMENT — REFRACTION_WEARINGRX
OD_SPHERE: -2.00
OS_AXIS: 090
OS_CYLINDER: +0.25
OS_SPHERE: -2.50
OD_CYLINDER: SPHERE

## 2024-06-27 ASSESSMENT — EXTERNAL EXAM - RIGHT EYE: OD_EXAM: NORMAL

## 2024-06-27 ASSESSMENT — SLIT LAMP EXAM - LIDS
COMMENTS: NORMAL
COMMENTS: NORMAL

## 2024-06-27 ASSESSMENT — TONOMETRY
OD_IOP_MMHG: 17
OS_IOP_MMHG: 16
IOP_METHOD: TONOPEN

## 2024-06-27 ASSESSMENT — VISUAL ACUITY
METHOD: SNELLEN - LINEAR
OS_CC: 20/20
OS_CC+: -3
OD_CC: 20/150
CORRECTION_TYPE: GLASSES

## 2024-06-27 ASSESSMENT — CUP TO DISC RATIO
OS_RATIO: 0.3
OD_RATIO: 0.3

## 2024-06-27 ASSESSMENT — EXTERNAL EXAM - LEFT EYE: OS_EXAM: NORMAL

## 2024-06-27 NOTE — NURSING NOTE
Chief Complaints and History of Present Illnesses   Patient presents with    Follow Up     1 week follow up YAG right eye      Chief Complaint(s) and History of Present Illness(es)       Follow Up              Associated symptoms: itching.  Negative for eye pain, flashes and floaters    Treatments tried: artificial tears    Pain scale: 0/10    Comments: 1 week follow up YAG right eye               Comments    Pt states her vision has improved since last week.   No eye pain, but has intermittent itching right eye.  No new flashes or floaters.   AT PRN each eye     Anselmo Peña 2:33 PM June 27, 2024

## 2024-06-27 NOTE — PROGRESS NOTES
CC: follow up proliferative diabetic retinopathy and status post yag right eye   Here for fluorescein angiography and follow up yag right eye   Interval: Pt states her vision has improved since last visit status post yag right eye. No eye pain. No new flashes or floaters.   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    Left Eye: trace ERM, parafoveal intraretinal fluid. - stable    Fluorescein angiography 06/27/24   Normal AV and choroidal filling  Right eye with staining of the laser scars. Mild late macula leakage  Left eye with mid peripheral leakage consistent with persistent neovascularization elsewhere and staining of the laser scars.     ASSESSMENT:     # status post complex cataract extraction with intraocular lens implantation OD  VA: 20/250. Intraocular pressure 16.  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 6.14.22  -status post  25g Pars plana vitrectomy / silicone oil removal / endolaser / air fluid exchange (Right) 12.13.22  Retina attached; peripheral laser scars  - Diabetic macular edema   -s/p avastin 3/7/24; 4.25.24    # left eye  proliferative diabetic retinopathy   -S/p PRP 2/17/22 and 6/8/22; 11/09/22   Recurrence of vitreous hemorrhage on 12/16/2023  - Had avastin injection 12/16/2023  - Vision improving  - avastin injection 01/11/24 ; 02/08/24 ; 03/07/24   Recommend avastin inj 04/11/24 ; 4.25.24 06/27/24 Recommend Panretinal laser photocoagulation (PRP) filling left eye given persistent neovascularization elsewhere      # Cataract  left eye  Not affecting vision  Monitor      PLAN:  recommend Panretinal laser photocoagulation (PRP) filling left eye 06/27/24   [facility] approved- Avastin inj both eyes 04/25/24   Follow up in 2 months with Optical Coherence Tomography and possible avastin inj both eyes   ~~~~~~~~~~~~~~~~~~~~~~~~~~~~~~~~~~   Complete documentation of historical and exam elements from today's encounter can be found in the full encounter summary report (not reduplicated in this progress note).  I personally obtained the chief complaint(s) and history of present illness.  I confirmed and edited as necessary the review of systems, past medical/surgical history, family history, social history, and examination findings as documented by others; and I examined the patient myself.  I personally reviewed the relevant tests, images, and reports as documented above.  I formulated and edited as necessary the assessment and plan and discussed the findings and management plan with the patient and family and No resident or fellow assisted with the procedures performed.  I performed the procedures myself.    Emmy Murphy MD  Professor of Ophthalmology  Vitreo-Retinal surgeon   Department of Ophthalmology and Visual  Neurosciences   [facility]  Phone: [phone]   Fax: [phone]

## 2024-08-13 DIAGNOSIS — E11.3513 PROLIFERATIVE DIABETIC RETINOPATHY OF BOTH EYES WITH MACULAR EDEMA ASSOCIATED WITH TYPE 2 DIABETES MELLITUS (H): Primary | ICD-10-CM

## 2024-08-29 ENCOUNTER — OFFICE VISIT (OUTPATIENT)
Dept: OPHTHALMOLOGY | Facility: CLINIC | Age: 44
End: 2024-08-29
Attending: OPHTHALMOLOGY

## 2024-08-29 DIAGNOSIS — E11.3513 PROLIFERATIVE DIABETIC RETINOPATHY OF BOTH EYES WITH MACULAR EDEMA ASSOCIATED WITH TYPE 2 DIABETES MELLITUS (H): ICD-10-CM

## 2024-08-29 PROCEDURE — 99213 OFFICE O/P EST LOW 20 MIN: CPT | Mod: GC | Performed by: OPHTHALMOLOGY

## 2024-08-29 PROCEDURE — 99213 OFFICE O/P EST LOW 20 MIN: CPT | Performed by: OPHTHALMOLOGY

## 2024-08-29 PROCEDURE — 92134 CPTRZ OPH DX IMG PST SGM RTA: CPT | Performed by: OPHTHALMOLOGY

## 2024-08-29 ASSESSMENT — CONF VISUAL FIELD
OD_INFERIOR_NASAL_RESTRICTION: 0
OD_NORMAL: 1
OD_INFERIOR_TEMPORAL_RESTRICTION: 0
OS_SUPERIOR_TEMPORAL_RESTRICTION: 0
OS_INFERIOR_NASAL_RESTRICTION: 0
OS_NORMAL: 1
OS_INFERIOR_TEMPORAL_RESTRICTION: 0
OD_SUPERIOR_NASAL_RESTRICTION: 0
OS_SUPERIOR_NASAL_RESTRICTION: 0
OD_SUPERIOR_TEMPORAL_RESTRICTION: 0

## 2024-08-29 ASSESSMENT — REFRACTION_WEARINGRX
OS_AXIS: 090
OD_CYLINDER: SPHERE
OS_CYLINDER: +0.25
OD_SPHERE: -2.00
OS_SPHERE: -2.50

## 2024-08-29 ASSESSMENT — VISUAL ACUITY
OD_CC: 20/125
CORRECTION_TYPE: GLASSES
OS_CC: 20/20
OD_CC+: -1
METHOD: SNELLEN - LINEAR

## 2024-08-29 ASSESSMENT — TONOMETRY
OS_IOP_MMHG: 15
OD_IOP_MMHG: 16
IOP_METHOD: TONOPEN

## 2024-08-29 ASSESSMENT — EXTERNAL EXAM - RIGHT EYE: OD_EXAM: NORMAL

## 2024-08-29 ASSESSMENT — CUP TO DISC RATIO
OS_RATIO: 0.3
OD_RATIO: 0.3

## 2024-08-29 ASSESSMENT — SLIT LAMP EXAM - LIDS
COMMENTS: NORMAL
COMMENTS: NORMAL

## 2024-08-29 ASSESSMENT — EXTERNAL EXAM - LEFT EYE: OS_EXAM: NORMAL

## 2024-08-29 NOTE — NURSING NOTE
"Chief Complaints and History of Present Illnesses   Patient presents with    Diabetic Retinopathy Follow Up     2mo follow up for PDR each eye      Chief Complaint(s) and History of Present Illness(es)       Diabetic Retinopathy Follow Up              Laterality: both eyes    Associated symptoms: dryness and burning.  Negative for eye pain    Treatments tried: artificial tears    Pain scale: 0/10    Comments: 2mo follow up for PDR each eye               Comments    Patient reports no change to vision since last visit.    Ocular Meds:   AT's 2-3 times daily      #131 via phone.     No results found for: \"A1C\" in system -- pt reports last A1c was 7.  Pt reports BS today 149.     Marcia Rehman OA 2:37 PM August 29, 2024                                 "

## 2024-08-29 NOTE — PROGRESS NOTES
CC: Follow up proliferative diabetic retinopathy and status post yag right eye     Interval: Vision is stable. 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    Left Eye: trace ERM, perifoveal intraretinal fluid. - stable    Fluorescein angiography 06/27/24   Normal AV and choroidal filling  Right eye with staining of the laser scars. Mild late macula leakage  Left eye with mid peripheral leakage consistent with persistent neovascularization elsewhere and staining of the laser scars.     ASSESSMENT:     # status post complex cataract extraction with intraocular lens implantation OD  VA: 20/250. Intraocular pressure 16.  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 6.14.22  -status post  25g  Pars plana vitrectomy / silicone oil removal / endolaser / air fluid exchange (Right) 12.13.22  Retina attached; peripheral laser scars  - Diabetic macular edema   -s/p avastin 3/7/24; 4.25.24    # left eye  proliferative diabetic retinopathy   -S/p PRP 2/17/22 and 6/8/22; 11/09/22   Recurrence of vitreous hemorrhage on 12/16/2023  - Had avastin injection 12/16/2023  - Vision improving  - avastin injection 01/11/24 ; 02/08/24 ; 03/07/24   Recommend avastin inj 04/11/24 ; 4.25.24 06/27/24 status post Panretinal laser photocoagulation (PRP) filling left eye given persistent neovascularization elsewhere      # Cataract  left eye  Not affecting vision  Monitor    PLAN: Follow up in 2-3 months with Optical Coherence Tomography an fluorescein angiography transits left eye   and possible avastin inj both eyes   [facility] approved- Avastin inj both eyes 04/25/24     Vito Catalan MD  Vitreoretinal Surgery Fellow   [facility]   ~~~~~~~~~~~~~~~~~~~~~~~~~~~~~~~~~~   Complete documentation of historical and exam elements from today's encounter can be found in the full encounter summary report (not reduplicated in this progress note).  I personally obtained the chief complaint(s) and history of present illness.  I confirmed and edited as necessary the review of systems, past medical/surgical history, family history, social history, and examination findings as documented by others; and I examined the patient myself.  I personally reviewed the relevant tests, images, and reports as documented above.  I personally reviewed the ophthalmic test(s) associated with this encounter, agree with the interpretation(s) as documented by the resident/fellow, and have edited the corresponding report(s) as necessary.   I formulated and edited as necessary the assessment and plan and discussed the findings and management plan with the patient and family    Emmy Murphy MD  Professor of Ophthalmology  Vitreo-Retinal  surgeon   Department of Ophthalmology and Visual Neurosciences   [facility]  Phone: [phone]   Fax: [phone]

## (undated) DEVICE — ESU CORD BIPOLAR GREEN 10-4000

## (undated) DEVICE — EYE CANN IRR 25GA CYSTOTOME 581610

## (undated) DEVICE — EYE KNIFE STILETTO VISITEC 1.1MM ANG 45DEG SIDEPORT 376620

## (undated) DEVICE — LINEN TOWEL PACK X5 5464

## (undated) DEVICE — EYE PACK 25GA CONSTELLATION 10,000 CPM PPK9380-02

## (undated) DEVICE — EYE SHIELD PLASTIC

## (undated) DEVICE — EYE NDL RETROBULBAR ATKINSON 25GA 1.5" 581637

## (undated) DEVICE — SOL WATER IRRIG 500ML BOTTLE 2F7113

## (undated) DEVICE — TUBING SUCTION 12"X1/4" N612

## (undated) DEVICE — EYE CANN SOFT TIP 25GA FOR VALVED SET 8065149530

## (undated) DEVICE — PACK VITRECTOMY/RET CUSTOM ASC PQ15VRU12

## (undated) DEVICE — SYR 01ML 27GA 0.5" ECLIPSE 305789

## (undated) DEVICE — EYE PACK CONSTELLATION VFC 8065750957

## (undated) DEVICE — EYE CANN IRR 27GA ANTERIOR CHAMBER 581280

## (undated) DEVICE — EYE PACK CUSTOM ANTERIOR 30DEG TIP CENTURION PPK6682-04

## (undated) DEVICE — PACK CATARACT CUSTOM ASC SEY15CPUMC

## (undated) DEVICE — TAPE MICROPORE 2"X1.5YD 1530S-2

## (undated) DEVICE — GLOVE BIOGEL PI MICRO SZ 6.0 48560

## (undated) DEVICE — GLOVE PROTEXIS MICRO 6.0  2D73PM60

## (undated) DEVICE — EYE KNIFE SLIT XSTAR VISITEC 2.5MM 45DEG BEVEL UP 373725

## (undated) DEVICE — DRAPE MICRO 41X81"

## (undated) DEVICE — SU PLAIN 6-0 TG140-8 18" 1735G

## (undated) DEVICE — TAPE MICROPORE 1"X1.5YD 1530S-1

## (undated) DEVICE — EYE PROBE ESU DIATHERMY DSP 25GA 339.21

## (undated) DEVICE — EYE TIP IRRIGATION & ASPIRATION POLYMER 35D BENT 8065751511

## (undated) RX ORDER — FENTANYL CITRATE-0.9 % NACL/PF 10 MCG/ML
PLASTIC BAG, INJECTION (ML) INTRAVENOUS
Status: DISPENSED
Start: 2022-06-14

## (undated) RX ORDER — FENTANYL CITRATE 50 UG/ML
INJECTION, SOLUTION INTRAMUSCULAR; INTRAVENOUS
Status: DISPENSED
Start: 2022-06-14

## (undated) RX ORDER — PROPOFOL 10 MG/ML
INJECTION, EMULSION INTRAVENOUS
Status: DISPENSED
Start: 2022-06-14

## (undated) RX ORDER — WATER 10 ML/10ML
INJECTION INTRAMUSCULAR; INTRAVENOUS; SUBCUTANEOUS
Status: DISPENSED
Start: 2022-06-14

## (undated) RX ORDER — ACETAMINOPHEN 325 MG/1
TABLET ORAL
Status: DISPENSED
Start: 2024-03-12

## (undated) RX ORDER — ACETAMINOPHEN 325 MG/1
TABLET ORAL
Status: DISPENSED
Start: 2022-12-13

## (undated) RX ORDER — LIDOCAINE HCL/PF 100 MG/5ML
SYRINGE (ML) INJECTION
Status: DISPENSED
Start: 2022-06-14

## (undated) RX ORDER — FENTANYL CITRATE 50 UG/ML
INJECTION, SOLUTION INTRAMUSCULAR; INTRAVENOUS
Status: DISPENSED
Start: 2024-03-12

## (undated) RX ORDER — FENTANYL CITRATE 50 UG/ML
INJECTION, SOLUTION INTRAMUSCULAR; INTRAVENOUS
Status: DISPENSED
Start: 2022-12-13

## (undated) RX ORDER — PROPOFOL 10 MG/ML
INJECTION, EMULSION INTRAVENOUS
Status: DISPENSED
Start: 2024-03-12

## (undated) RX ORDER — ONDANSETRON 2 MG/ML
INJECTION INTRAMUSCULAR; INTRAVENOUS
Status: DISPENSED
Start: 2022-12-13

## (undated) RX ORDER — DEXAMETHASONE SODIUM PHOSPHATE 4 MG/ML
INJECTION, SOLUTION INTRA-ARTICULAR; INTRALESIONAL; INTRAMUSCULAR; INTRAVENOUS; SOFT TISSUE
Status: DISPENSED
Start: 2022-06-14

## (undated) RX ORDER — ONDANSETRON 2 MG/ML
INJECTION INTRAMUSCULAR; INTRAVENOUS
Status: DISPENSED
Start: 2022-06-14

## (undated) RX ORDER — ACETAMINOPHEN 325 MG/1
TABLET ORAL
Status: DISPENSED
Start: 2022-06-14